# Patient Record
Sex: FEMALE | Race: BLACK OR AFRICAN AMERICAN | NOT HISPANIC OR LATINO | Employment: OTHER | URBAN - METROPOLITAN AREA
[De-identification: names, ages, dates, MRNs, and addresses within clinical notes are randomized per-mention and may not be internally consistent; named-entity substitution may affect disease eponyms.]

---

## 2023-02-05 PROBLEM — G89.29 CHRONIC UPPER ABDOMINAL PAIN: Status: ACTIVE | Noted: 2023-02-05

## 2023-02-05 PROBLEM — E78.5 DYSLIPIDEMIA: Status: ACTIVE | Noted: 2023-02-05

## 2023-02-05 PROBLEM — M25.50 JOINT PAIN: Status: ACTIVE | Noted: 2023-02-05

## 2023-02-05 PROBLEM — M54.12 CERVICAL RADICULOPATHY: Status: ACTIVE | Noted: 2023-02-05

## 2023-02-05 PROBLEM — K25.9 GASTRIC ULCER: Status: ACTIVE | Noted: 2023-02-05

## 2023-02-05 PROBLEM — A04.8 H. PYLORI INFECTION: Status: ACTIVE | Noted: 2023-02-05

## 2023-02-05 PROBLEM — N28.1 CYST OF RIGHT KIDNEY: Status: ACTIVE | Noted: 2023-02-05

## 2023-02-05 PROBLEM — U07.1 COVID-19 VIRUS INFECTION: Status: RESOLVED | Noted: 2023-02-05 | Resolved: 2023-02-05

## 2023-02-05 PROBLEM — Z85.42 HISTORY OF UTERINE CANCER: Status: ACTIVE | Noted: 2023-02-05

## 2023-02-05 PROBLEM — J20.9 ACUTE BRONCHITIS: Status: RESOLVED | Noted: 2023-02-05 | Resolved: 2023-02-05

## 2023-02-05 PROBLEM — H10.30 ACUTE CONJUNCTIVITIS: Status: RESOLVED | Noted: 2023-02-05 | Resolved: 2023-02-05

## 2023-02-05 PROBLEM — M54.50 LOW BACK PAIN: Status: ACTIVE | Noted: 2023-02-05

## 2023-02-05 PROBLEM — R10.13 EPIGASTRIC PAIN: Status: ACTIVE | Noted: 2023-02-05

## 2023-02-05 PROBLEM — E87.6 HYPOKALEMIA: Status: ACTIVE | Noted: 2023-02-05

## 2023-02-05 PROBLEM — R79.9 ABNORMAL BLOOD CHEMISTRY: Status: RESOLVED | Noted: 2023-02-05 | Resolved: 2023-02-05

## 2023-02-05 PROBLEM — G62.9 PERIPHERAL NERVE DISEASE: Status: ACTIVE | Noted: 2023-02-05

## 2023-02-05 PROBLEM — R10.11 COLICKY RUQ ABDOMINAL PAIN: Status: ACTIVE | Noted: 2023-02-05

## 2023-02-05 PROBLEM — K04.7 DENTAL ABSCESS: Status: ACTIVE | Noted: 2023-02-05

## 2023-02-05 PROBLEM — R05.9 COUGH: Status: RESOLVED | Noted: 2023-02-05 | Resolved: 2023-02-05

## 2023-02-05 PROBLEM — R10.9 ABDOMINAL PAIN: Status: RESOLVED | Noted: 2023-02-05 | Resolved: 2023-02-05

## 2023-02-05 PROBLEM — I10 BP (HIGH BLOOD PRESSURE): Status: ACTIVE | Noted: 2023-02-05

## 2023-02-05 PROBLEM — R09.89 CAROTID BRUIT: Status: ACTIVE | Noted: 2023-02-05

## 2023-02-05 PROBLEM — R10.10 CHRONIC UPPER ABDOMINAL PAIN: Status: ACTIVE | Noted: 2023-02-05

## 2023-02-05 PROBLEM — E55.9 VITAMIN D DEFICIENCY: Status: ACTIVE | Noted: 2023-02-05

## 2023-02-05 PROBLEM — K43.2 HERNIA, INCISIONAL: Status: ACTIVE | Noted: 2023-02-05

## 2023-02-05 PROBLEM — K29.70 HELICOBACTER POSITIVE GASTRITIS: Status: ACTIVE | Noted: 2023-02-05

## 2023-02-05 PROBLEM — N18.30 CKD (CHRONIC KIDNEY DISEASE), STAGE III (MULTI): Status: ACTIVE | Noted: 2023-02-05

## 2023-02-05 PROBLEM — E66.812 CLASS 2 SEVERE OBESITY WITH SERIOUS COMORBIDITY IN ADULT: Status: ACTIVE | Noted: 2023-02-05

## 2023-02-05 PROBLEM — H61.23 BILATERAL IMPACTED CERUMEN: Status: RESOLVED | Noted: 2023-02-05 | Resolved: 2023-02-05

## 2023-02-05 PROBLEM — S93.422S SPRAIN, ANKLE JOINT, MEDIAL, LEFT, SEQUELA: Status: ACTIVE | Noted: 2023-02-05

## 2023-02-05 PROBLEM — R14.0 ABDOMINAL BLOATING: Status: RESOLVED | Noted: 2023-02-05 | Resolved: 2023-02-05

## 2023-02-05 PROBLEM — M47.816 LUMBAR SPONDYLOSIS: Status: ACTIVE | Noted: 2023-02-05

## 2023-02-05 PROBLEM — R60.9 EDEMA: Status: ACTIVE | Noted: 2023-02-05

## 2023-02-05 PROBLEM — R00.2 PALPITATIONS: Status: ACTIVE | Noted: 2023-02-05

## 2023-02-05 PROBLEM — M75.81 ROTATOR CUFF TENDINITIS, RIGHT: Status: ACTIVE | Noted: 2023-02-05

## 2023-02-05 PROBLEM — M25.511 SHOULDER PAIN, RIGHT: Status: RESOLVED | Noted: 2023-02-05 | Resolved: 2023-02-05

## 2023-02-05 PROBLEM — R35.0 INCREASED FREQUENCY OF URINATION: Status: ACTIVE | Noted: 2023-02-05

## 2023-02-05 PROBLEM — E66.01 CLASS 2 SEVERE OBESITY WITH SERIOUS COMORBIDITY IN ADULT (MULTI): Status: ACTIVE | Noted: 2023-02-05

## 2023-02-05 PROBLEM — B96.81 HELICOBACTER POSITIVE GASTRITIS: Status: ACTIVE | Noted: 2023-02-05

## 2023-02-05 PROBLEM — K42.9 RECURRENT UMBILICAL HERNIA: Status: ACTIVE | Noted: 2023-02-05

## 2023-02-05 PROBLEM — M85.80 OSTEOPENIA: Status: ACTIVE | Noted: 2023-02-05

## 2023-02-05 PROBLEM — M54.2 NECK PAIN ON RIGHT SIDE: Status: ACTIVE | Noted: 2023-02-05

## 2023-02-05 RX ORDER — TRAMADOL HYDROCHLORIDE 50 MG/1
TABLET ORAL
COMMUNITY
End: 2023-09-05 | Stop reason: ALTCHOICE

## 2023-02-05 RX ORDER — ERGOCALCIFEROL 1.25 MG/1
CAPSULE ORAL
COMMUNITY
End: 2023-09-05 | Stop reason: SDUPTHER

## 2023-02-05 RX ORDER — LANSOPRAZOLE 30 MG/1
30 CAPSULE, DELAYED RELEASE ORAL
COMMUNITY
End: 2023-03-27 | Stop reason: SDUPTHER

## 2023-02-05 RX ORDER — DICYCLOMINE HYDROCHLORIDE 20 MG/1
TABLET ORAL
COMMUNITY
End: 2023-10-19 | Stop reason: ALTCHOICE

## 2023-02-05 RX ORDER — LISINOPRIL AND HYDROCHLOROTHIAZIDE 20; 25 MG/1; MG/1
TABLET ORAL
COMMUNITY
End: 2023-03-27 | Stop reason: SDUPTHER

## 2023-02-05 RX ORDER — POTASSIUM CHLORIDE 20 MEQ/1
TABLET, EXTENDED RELEASE ORAL
COMMUNITY
End: 2023-03-27 | Stop reason: SDUPTHER

## 2023-02-05 RX ORDER — ATORVASTATIN CALCIUM 20 MG/1
TABLET, FILM COATED ORAL
COMMUNITY
End: 2023-06-15 | Stop reason: SDUPTHER

## 2023-02-05 RX ORDER — NALOXONE HYDROCHLORIDE 4 MG/.1ML
SPRAY NASAL
COMMUNITY
End: 2023-10-19 | Stop reason: ALTCHOICE

## 2023-02-15 PROBLEM — R20.0 NUMBNESS: Status: ACTIVE | Noted: 2023-02-15

## 2023-03-27 DIAGNOSIS — I10 HYPERTENSION, UNSPECIFIED TYPE: ICD-10-CM

## 2023-03-27 DIAGNOSIS — K25.9 GASTRIC ULCER, UNSPECIFIED CHRONICITY, UNSPECIFIED WHETHER GASTRIC ULCER HEMORRHAGE OR PERFORATION PRESENT: ICD-10-CM

## 2023-03-27 DIAGNOSIS — E87.6 POTASSIUM DEFICIENCY: ICD-10-CM

## 2023-03-27 RX ORDER — POTASSIUM CHLORIDE 20 MEQ/1
40 TABLET, EXTENDED RELEASE ORAL DAILY
Qty: 60 TABLET | Refills: 0 | Status: SHIPPED | OUTPATIENT
Start: 2023-03-27 | End: 2023-06-15 | Stop reason: SDUPTHER

## 2023-03-27 RX ORDER — LISINOPRIL AND HYDROCHLOROTHIAZIDE 20; 25 MG/1; MG/1
1 TABLET ORAL DAILY
Qty: 90 TABLET | Refills: 0 | Status: SHIPPED | OUTPATIENT
Start: 2023-03-27 | End: 2023-07-25 | Stop reason: SDUPTHER

## 2023-03-27 RX ORDER — LANSOPRAZOLE 30 MG/1
30 CAPSULE, DELAYED RELEASE ORAL
Qty: 90 CAPSULE | Refills: 0 | Status: SHIPPED | OUTPATIENT
Start: 2023-03-27 | End: 2024-01-02 | Stop reason: SDUPTHER

## 2023-04-11 ENCOUNTER — OFFICE VISIT (OUTPATIENT)
Dept: PRIMARY CARE | Facility: CLINIC | Age: 73
End: 2023-04-11
Payer: MEDICARE

## 2023-04-11 VITALS
HEART RATE: 80 BPM | WEIGHT: 209 LBS | DIASTOLIC BLOOD PRESSURE: 87 MMHG | SYSTOLIC BLOOD PRESSURE: 122 MMHG | BODY MASS INDEX: 39.46 KG/M2 | HEIGHT: 61 IN

## 2023-04-11 DIAGNOSIS — M54.12 CERVICAL RADICULOPATHY: ICD-10-CM

## 2023-04-11 DIAGNOSIS — N18.31 STAGE 3A CHRONIC KIDNEY DISEASE (MULTI): ICD-10-CM

## 2023-04-11 DIAGNOSIS — E66.01 CLASS 2 SEVERE OBESITY WITH SERIOUS COMORBIDITY AND BODY MASS INDEX (BMI) OF 39.0 TO 39.9 IN ADULT, UNSPECIFIED OBESITY TYPE (MULTI): ICD-10-CM

## 2023-04-11 DIAGNOSIS — C54.9 MALIGNANT NEOPLASM OF BODY OF UTERUS, UNSPECIFIED SITE (MULTI): ICD-10-CM

## 2023-04-11 DIAGNOSIS — E87.6 HYPOKALEMIA: ICD-10-CM

## 2023-04-11 DIAGNOSIS — R20.0 NUMBNESS: ICD-10-CM

## 2023-04-11 DIAGNOSIS — I10 PRIMARY HYPERTENSION: Primary | ICD-10-CM

## 2023-04-11 DIAGNOSIS — M79.641 RIGHT HAND PAIN: ICD-10-CM

## 2023-04-11 DIAGNOSIS — R10.13 EPIGASTRIC PAIN: ICD-10-CM

## 2023-04-11 LAB
ALANINE AMINOTRANSFERASE (SGPT) (U/L) IN SER/PLAS: 32 U/L (ref 7–45)
ALBUMIN (G/DL) IN SER/PLAS: 3.6 G/DL (ref 3.4–5)
ALKALINE PHOSPHATASE (U/L) IN SER/PLAS: 74 U/L (ref 33–136)
ANION GAP IN SER/PLAS: 11 MMOL/L (ref 10–20)
ASPARTATE AMINOTRANSFERASE (SGOT) (U/L) IN SER/PLAS: 49 U/L (ref 9–39)
BILIRUBIN TOTAL (MG/DL) IN SER/PLAS: 0.6 MG/DL (ref 0–1.2)
CALCIUM (MG/DL) IN SER/PLAS: 8.8 MG/DL (ref 8.6–10.6)
CARBON DIOXIDE, TOTAL (MMOL/L) IN SER/PLAS: 31 MMOL/L (ref 21–32)
CHLORIDE (MMOL/L) IN SER/PLAS: 104 MMOL/L (ref 98–107)
CHOLESTEROL (MG/DL) IN SER/PLAS: 202 MG/DL (ref 0–199)
CHOLESTEROL IN HDL (MG/DL) IN SER/PLAS: 85.1 MG/DL
CHOLESTEROL/HDL RATIO: 2.4
CREATININE (MG/DL) IN SER/PLAS: 0.85 MG/DL (ref 0.5–1.05)
ERYTHROCYTE DISTRIBUTION WIDTH (RATIO) BY AUTOMATED COUNT: 13.3 % (ref 11.5–14.5)
ERYTHROCYTE MEAN CORPUSCULAR HEMOGLOBIN CONCENTRATION (G/DL) BY AUTOMATED: 32.1 G/DL (ref 32–36)
ERYTHROCYTE MEAN CORPUSCULAR VOLUME (FL) BY AUTOMATED COUNT: 97 FL (ref 80–100)
ERYTHROCYTES (10*6/UL) IN BLOOD BY AUTOMATED COUNT: 4.22 X10E12/L (ref 4–5.2)
GFR FEMALE: 73 ML/MIN/1.73M2
GLUCOSE (MG/DL) IN SER/PLAS: 87 MG/DL (ref 74–99)
HEMATOCRIT (%) IN BLOOD BY AUTOMATED COUNT: 40.8 % (ref 36–46)
HEMOGLOBIN (G/DL) IN BLOOD: 13.1 G/DL (ref 12–16)
LDL: 97 MG/DL (ref 0–99)
LEUKOCYTES (10*3/UL) IN BLOOD BY AUTOMATED COUNT: 5.8 X10E9/L (ref 4.4–11.3)
NRBC (PER 100 WBCS) BY AUTOMATED COUNT: 0 /100 WBC (ref 0–0)
PLATELETS (10*3/UL) IN BLOOD AUTOMATED COUNT: 241 X10E9/L (ref 150–450)
POTASSIUM (MMOL/L) IN SER/PLAS: 4 MMOL/L (ref 3.5–5.3)
PROTEIN TOTAL: 6.1 G/DL (ref 6.4–8.2)
SODIUM (MMOL/L) IN SER/PLAS: 142 MMOL/L (ref 136–145)
THYROTROPIN (MIU/L) IN SER/PLAS BY DETECTION LIMIT <= 0.05 MIU/L: 2.27 MIU/L (ref 0.44–3.98)
TRIGLYCERIDE (MG/DL) IN SER/PLAS: 99 MG/DL (ref 0–149)
UREA NITROGEN (MG/DL) IN SER/PLAS: 12 MG/DL (ref 6–23)
VLDL: 20 MG/DL (ref 0–40)

## 2023-04-11 PROCEDURE — 85027 COMPLETE CBC AUTOMATED: CPT

## 2023-04-11 PROCEDURE — 80061 LIPID PANEL: CPT

## 2023-04-11 PROCEDURE — 80053 COMPREHEN METABOLIC PANEL: CPT

## 2023-04-11 PROCEDURE — 1159F MED LIST DOCD IN RCRD: CPT | Performed by: FAMILY MEDICINE

## 2023-04-11 PROCEDURE — 1036F TOBACCO NON-USER: CPT | Performed by: FAMILY MEDICINE

## 2023-04-11 PROCEDURE — 3079F DIAST BP 80-89 MM HG: CPT | Performed by: FAMILY MEDICINE

## 2023-04-11 PROCEDURE — 36415 COLL VENOUS BLD VENIPUNCTURE: CPT | Performed by: FAMILY MEDICINE

## 2023-04-11 PROCEDURE — 84443 ASSAY THYROID STIM HORMONE: CPT

## 2023-04-11 PROCEDURE — 3074F SYST BP LT 130 MM HG: CPT | Performed by: FAMILY MEDICINE

## 2023-04-11 PROCEDURE — 99214 OFFICE O/P EST MOD 30 MIN: CPT | Performed by: FAMILY MEDICINE

## 2023-04-11 PROCEDURE — 3008F BODY MASS INDEX DOCD: CPT | Performed by: FAMILY MEDICINE

## 2023-04-11 ASSESSMENT — ENCOUNTER SYMPTOMS
NECK PAIN: 1
ALLERGIC/IMMUNOLOGIC NEGATIVE: 1
CONSTITUTIONAL NEGATIVE: 1
HEMATOLOGIC/LYMPHATIC NEGATIVE: 1
ENDOCRINE NEGATIVE: 1
NEUROLOGICAL NEGATIVE: 1
EYES NEGATIVE: 1
CARDIOVASCULAR NEGATIVE: 1
GASTROINTESTINAL NEGATIVE: 1
RESPIRATORY NEGATIVE: 1

## 2023-04-11 NOTE — PROGRESS NOTES
"Subjective   Patient ID: Ana Lira is a 73 y.o. female who presents for Follow-up (3 month chest tightness ongoing) and Neck Pain.  Today she is accompanied by alone.     Neck.  Specially to the right side states she reads a lot and is looking down most of the day denies any recent injuries  Blood pressure controlled  Trying to lose weight  Abdominal pain improved taking Gaviscon  Has a small cyst on the right thumb which has been painful    Neck Pain       Current Outpatient Medications on File Prior to Visit   Medication Sig Dispense Refill    atorvastatin (Lipitor) 20 mg tablet Take 1 tablet daily  APOT TAB      dicyclomine (Bentyl) 20 mg tablet TAKE 1 TABLET EVERY 6 HOURS AS NEEDED.      ergocalciferol (Vitamin D-2) 1.25 MG (77501 UT) capsule TAKE 1 CAPSULE Weekly      lansoprazole (Prevacid) 30 mg DR capsule Take 1 capsule (30 mg) by mouth once daily in the morning. Take before meals. Do not crush or chew. 90 capsule 0    lisinopriL-hydrochlorothiazide 20-25 mg tablet Take 1 tablet by mouth once daily. 90 tablet 0    potassium chloride CR (Klor-Con M20) 20 mEq ER tablet Take 2 tablets (40 mEq) by mouth once daily. Do not crush or chew. 60 tablet 0    naloxone (Narcan) 4 mg/0.1 mL nasal spray Apply 1 spray in each nostril AS NEEDED for OPIATE OVERDOSE or shallow breathing/ severe sedation.      traMADol (Ultram) 50 mg tablet TAKE 1 TABLET 3 TIMES DAILY AS NEEDED.       No current facility-administered medications on file prior to visit.        Review of Systems   Constitutional: Negative.    HENT: Negative.     Eyes: Negative.    Respiratory: Negative.     Cardiovascular: Negative.    Gastrointestinal: Negative.    Endocrine: Negative.    Genitourinary: Negative.    Musculoskeletal:  Positive for neck pain.   Allergic/Immunologic: Negative.    Neurological: Negative.    Hematological: Negative.        Objective   Blood Pressure 122/87   Pulse 80   Height 1.549 m (5' 1\")   Weight 94.8 kg (209 lb)   Body " Mass Index 39.49 kg/m²   BSA: 2.02 meters squared  Growth percentiles: Facility age limit for growth %tl is 20 years. Facility age limit for growth %tl is 20 years.   Office Visit on 04/11/2023   Component Date Value Ref Range Status    Glucose 04/11/2023 87  74 - 99 mg/dL Final    Sodium 04/11/2023 142  136 - 145 mmol/L Final    Potassium 04/11/2023 4.0  3.5 - 5.3 mmol/L Final    Chloride 04/11/2023 104  98 - 107 mmol/L Final    Bicarbonate 04/11/2023 31  21 - 32 mmol/L Final    Anion Gap 04/11/2023 11  10 - 20 mmol/L Final    Urea Nitrogen 04/11/2023 12  6 - 23 mg/dL Final    Creatinine 04/11/2023 0.85  0.50 - 1.05 mg/dL Final    GFR Female 04/11/2023 73  >90 mL/min/1.73m2 Final     CALCULATIONS OF ESTIMATED GFR ARE PERFORMED   USING THE 2021 CKD-EPI STUDY REFIT EQUATION   WITHOUT THE RACE VARIABLE FOR THE IDMS-TRACEABLE   CREATININE METHODS.    https://jasn.asnjournals.org/content/early/2021/09/22/ASN.7703343014    Calcium 04/11/2023 8.8  8.6 - 10.6 mg/dL Final    Albumin 04/11/2023 3.6  3.4 - 5.0 g/dL Final    Alkaline Phosphatase 04/11/2023 74  33 - 136 U/L Final    Total Protein 04/11/2023 6.1 (L)  6.4 - 8.2 g/dL Final    AST 04/11/2023 49 (H)  9 - 39 U/L Final    Total Bilirubin 04/11/2023 0.6  0.0 - 1.2 mg/dL Final    ALT (SGPT) 04/11/2023 32  7 - 45 U/L Final     Patients treated with Sulfasalazine may generate    falsely decreased results for ALT.    Cholesterol 04/11/2023 202 (H)  0 - 199 mg/dL Final    .      AGE      DESIRABLE   BORDERLINE HIGH   HIGH     0-19 Y     0 - 169       170 - 199     >/= 200    20-24 Y     0 - 189       190 - 224     >/= 225         >24 Y     0 - 199       200 - 239     >/= 240   **All ranges are based on fasting samples. Specific   therapeutic targets will vary based on patient-specific   cardiac risk.  .   Pediatric guidelines reference:Pediatrics 2011, 128(S5).   Adult guidelines reference: NCEP ATPIII Guidelines,     MELCHOR 2001, 258:2486-97  .   Venipuncture immediately  after or during the    administration of Metamizole may lead to falsely   low results. Testing should be performed immediately   prior to Metamizole dosing.    HDL 04/11/2023 85.1  mg/dL Final    .      AGE      VERY LOW   LOW     NORMAL    HIGH       0-19 Y       < 35   < 40     40-45     ----    20-24 Y       ----   < 40       >45     ----      >24 Y       ----   < 40     40-60      >60  .    Cholesterol/HDL Ratio 04/11/2023 2.4   Final    REF VALUES  DESIRABLE  < 3.4  HIGH RISK  > 5.0    LDL 04/11/2023 97  0 - 99 mg/dL Final    .                           NEAR      BORD      AGE      DESIRABLE  OPTIMAL    HIGH     HIGH     VERY HIGH     0-19 Y     0 - 109     ---    110-129   >/= 130     ----    20-24 Y     0 - 119     ---    120-159   >/= 160     ----      >24 Y     0 -  99   100-129  130-159   160-189     >/=190  .    VLDL 04/11/2023 20  0 - 40 mg/dL Final    Triglycerides 04/11/2023 99  0 - 149 mg/dL Final    .      AGE      DESIRABLE   BORDERLINE HIGH   HIGH     VERY HIGH   0 D-90 D    19 - 174         ----         ----        ----  91 D- 9 Y     0 -  74        75 -  99     >/= 100      ----    10-19 Y     0 -  89        90 - 129     >/= 130      ----    20-24 Y     0 - 114       115 - 149     >/= 150      ----         >24 Y     0 - 149       150 - 199    200- 499    >/= 500  .   Venipuncture immediately after or during the    administration of Metamizole may lead to falsely   low results. Testing should be performed immediately   prior to Metamizole dosing.    TSH 04/11/2023 2.27  0.44 - 3.98 mIU/L Final     TSH testing is performed using different testing    methodology at [facility] than at other    [facility]. Direct result comparisons should    only be made within the same method.    WBC 04/11/2023 5.8  4.4 - 11.3 x10E9/L Final    nRBC 04/11/2023 0.0  0.0 - 0.0 /100 WBC Final    RBC 04/11/2023 4.22  4.00 - 5.20 x10E12/L Final    Hemoglobin 04/11/2023 13.1  12.0 - 16.0 g/dL Final     Hematocrit 04/11/2023 40.8  36.0 - 46.0 % Final    MCV 04/11/2023 97  80 - 100 fL Final    MCHC 04/11/2023 32.1  32.0 - 36.0 g/dL Final    Platelets 04/11/2023 241  150 - 450 x10E9/L Final    RDW 04/11/2023 13.3  11.5 - 14.5 % Final      Physical Exam  Vitals and nursing note reviewed.   Constitutional:       Appearance: Normal appearance.   HENT:      Head: Normocephalic and atraumatic.      Right Ear: Tympanic membrane normal.      Left Ear: Tympanic membrane normal.      Nose: Nose normal.      Mouth/Throat:      Mouth: Mucous membranes are moist.   Eyes:      Pupils: Pupils are equal, round, and reactive to light.   Cardiovascular:      Rate and Rhythm: Normal rate and regular rhythm.      Pulses: Normal pulses.      Heart sounds: Normal heart sounds.   Pulmonary:      Effort: Pulmonary effort is normal.      Breath sounds: Normal breath sounds.   Abdominal:      General: Abdomen is flat. Bowel sounds are normal.      Palpations: Abdomen is soft.   Musculoskeletal:         General: Normal range of motion.      Cervical back: Normal range of motion and neck supple.   Skin:     General: Skin is warm and dry.      Capillary Refill: Capillary refill takes less than 2 seconds.   Neurological:      General: No focal deficit present.      Mental Status: She is alert and oriented to person, place, and time.   Psychiatric:         Mood and Affect: Mood normal.         Assessment/Plan   Problem List Items Addressed This Visit          Nervous    Cervical radiculopathy     Continue to use heat massage lidocaine avoid looking down or looking up for any length of time call back if symptoms do not improve         Epigastric pain     Continue follow-up with GI         Numbness     Symptoms improved         Relevant Orders    Lipid Panel (Completed)    TSH with reflex to Free T4 if abnormal (Completed)    CBC (Completed)       Circulatory    BP (high blood pressure) - Primary     Continue medication we will monitor blood  pressure at home         Relevant Orders    Comprehensive Metabolic Panel (Completed)    Lipid Panel (Completed)    TSH with reflex to Free T4 if abnormal (Completed)    CBC (Completed)       Genitourinary    CKD (chronic kidney disease), stage III (CMS/HCC)     Will check labs         Relevant Orders    Lipid Panel (Completed)    TSH with reflex to Free T4 if abnormal (Completed)    CBC (Completed)    RESOLVED: Malignant neoplasm of body of uterus, unspecified site (CMS/HCC)       Musculoskeletal    Right hand pain    Relevant Orders    XR hand right 3+ views       Endocrine/Metabolic    Class 2 severe obesity with serious comorbidity in adult (CMS/[facility])     patient is advised to lose weight by reducing calorie intake and increasing activity levels, especially aerobic exercise             Other    Hypokalemia    Relevant Orders    Lipid Panel (Completed)    TSH with reflex to Free T4 if abnormal (Completed)    CBC (Completed)     [unfilled]    Assessment/Plan   Problem List Items Addressed This Visit          Nervous    Cervical radiculopathy     Continue to use heat massage lidocaine avoid looking down or looking up for any length of time call back if symptoms do not improve         Epigastric pain     Continue follow-up with GI         Numbness     Symptoms improved         Relevant Orders    Lipid Panel (Completed)    TSH with reflex to Free T4 if abnormal (Completed)    CBC (Completed)       Circulatory    BP (high blood pressure) - Primary     Continue medication we will monitor blood pressure at home         Relevant Orders    Comprehensive Metabolic Panel (Completed)    Lipid Panel (Completed)    TSH with reflex to Free T4 if abnormal (Completed)    CBC (Completed)       Genitourinary    CKD (chronic kidney disease), stage III (CMS/HCC)     Will check labs         Relevant Orders    Lipid Panel (Completed)    TSH with reflex to Free T4 if abnormal (Completed)    CBC (Completed)    RESOLVED: Malignant neoplasm  of body of uterus, unspecified site (CMS/HCC)       Musculoskeletal    Right hand pain    Relevant Orders    XR hand right 3+ views       Endocrine/Metabolic    Class 2 severe obesity with serious comorbidity in adult (CMS/HCC)     patient is advised to lose weight by reducing calorie intake and increasing activity levels, especially aerobic exercise             Other    Hypokalemia    Relevant Orders    Lipid Panel (Completed)    TSH with reflex to Free T4 if abnormal (Completed)    CBC (Completed)

## 2023-04-12 ENCOUNTER — TELEPHONE (OUTPATIENT)
Dept: PRIMARY CARE | Facility: CLINIC | Age: 73
End: 2023-04-12
Payer: MEDICARE

## 2023-04-12 NOTE — TELEPHONE ENCOUNTER
----- Message from Taurus Espinosa MD sent at 4/12/2023  8:19 AM EDT -----  Please call the patient regarding her abnormal result.  Liver enzyme slightly elevated. Repeat hepatic function and GGT in 3-4 weeks as nurse visit. Avoid alcohol.

## 2023-04-13 PROBLEM — M79.641 RIGHT HAND PAIN: Status: ACTIVE | Noted: 2023-04-13

## 2023-04-13 PROBLEM — C54.9: Status: RESOLVED | Noted: 2023-04-13 | Resolved: 2023-04-13

## 2023-04-13 PROBLEM — C54.9: Status: ACTIVE | Noted: 2023-04-13

## 2023-04-13 NOTE — ASSESSMENT & PLAN NOTE
Continue follow-up with GI  
Continue medication we will monitor blood pressure at home  
Continue to use heat massage lidocaine avoid looking down or looking up for any length of time call back if symptoms do not improve  
Symptoms improved  
Will check labs  
patient is advised to lose weight by reducing calorie intake and increasing activity levels, especially aerobic exercise   
complains of pain/discomfort

## 2023-04-14 ENCOUNTER — TELEPHONE (OUTPATIENT)
Dept: PRIMARY CARE | Facility: CLINIC | Age: 73
End: 2023-04-14
Payer: MEDICARE

## 2023-04-21 ENCOUNTER — TELEPHONE (OUTPATIENT)
Dept: PRIMARY CARE | Facility: CLINIC | Age: 73
End: 2023-04-21
Payer: MEDICARE

## 2023-05-16 ENCOUNTER — CLINICAL SUPPORT (OUTPATIENT)
Dept: PRIMARY CARE | Facility: CLINIC | Age: 73
End: 2023-05-16
Payer: MEDICARE

## 2023-05-16 DIAGNOSIS — R74.8 ELEVATED LIVER ENZYMES: ICD-10-CM

## 2023-05-16 LAB
ALANINE AMINOTRANSFERASE (SGPT) (U/L) IN SER/PLAS: 27 U/L (ref 7–45)
ALBUMIN (G/DL) IN SER/PLAS: 3.9 G/DL (ref 3.4–5)
ALKALINE PHOSPHATASE (U/L) IN SER/PLAS: 66 U/L (ref 33–136)
ASPARTATE AMINOTRANSFERASE (SGOT) (U/L) IN SER/PLAS: 35 U/L (ref 9–39)
BILIRUBIN DIRECT (MG/DL) IN SER/PLAS: 0.1 MG/DL (ref 0–0.3)
BILIRUBIN TOTAL (MG/DL) IN SER/PLAS: 0.6 MG/DL (ref 0–1.2)
GAMMA GLUTAMYL TRANSFERASE (U/L) IN SER/PLAS: 25 U/L (ref 5–55)
PROTEIN TOTAL: 6.5 G/DL (ref 6.4–8.2)

## 2023-05-16 PROCEDURE — 36415 COLL VENOUS BLD VENIPUNCTURE: CPT

## 2023-05-16 PROCEDURE — 82977 ASSAY OF GGT: CPT

## 2023-05-16 PROCEDURE — 80076 HEPATIC FUNCTION PANEL: CPT

## 2023-06-15 ENCOUNTER — OFFICE VISIT (OUTPATIENT)
Dept: PRIMARY CARE | Facility: CLINIC | Age: 73
End: 2023-06-15
Payer: MEDICARE

## 2023-06-15 VITALS
HEIGHT: 61 IN | BODY MASS INDEX: 38.51 KG/M2 | HEART RATE: 101 BPM | DIASTOLIC BLOOD PRESSURE: 82 MMHG | SYSTOLIC BLOOD PRESSURE: 111 MMHG | WEIGHT: 204 LBS

## 2023-06-15 DIAGNOSIS — E87.6 POTASSIUM DEFICIENCY: ICD-10-CM

## 2023-06-15 DIAGNOSIS — I10 PRIMARY HYPERTENSION: ICD-10-CM

## 2023-06-15 DIAGNOSIS — R10.10 CHRONIC UPPER ABDOMINAL PAIN: ICD-10-CM

## 2023-06-15 DIAGNOSIS — E78.5 DYSLIPIDEMIA: ICD-10-CM

## 2023-06-15 DIAGNOSIS — N18.31 STAGE 3A CHRONIC KIDNEY DISEASE (MULTI): ICD-10-CM

## 2023-06-15 DIAGNOSIS — R00.2 PALPITATIONS: ICD-10-CM

## 2023-06-15 DIAGNOSIS — E66.01 CLASS 2 SEVERE OBESITY WITH SERIOUS COMORBIDITY AND BODY MASS INDEX (BMI) OF 38.0 TO 38.9 IN ADULT, UNSPECIFIED OBESITY TYPE (MULTI): ICD-10-CM

## 2023-06-15 DIAGNOSIS — M47.816 LUMBAR SPONDYLOSIS: ICD-10-CM

## 2023-06-15 DIAGNOSIS — Z12.31 VISIT FOR SCREENING MAMMOGRAM: ICD-10-CM

## 2023-06-15 DIAGNOSIS — R00.2 PALPITATION: Primary | ICD-10-CM

## 2023-06-15 DIAGNOSIS — G89.29 CHRONIC UPPER ABDOMINAL PAIN: ICD-10-CM

## 2023-06-15 PROCEDURE — 3079F DIAST BP 80-89 MM HG: CPT | Performed by: FAMILY MEDICINE

## 2023-06-15 PROCEDURE — 1159F MED LIST DOCD IN RCRD: CPT | Performed by: FAMILY MEDICINE

## 2023-06-15 PROCEDURE — 3074F SYST BP LT 130 MM HG: CPT | Performed by: FAMILY MEDICINE

## 2023-06-15 PROCEDURE — 1036F TOBACCO NON-USER: CPT | Performed by: FAMILY MEDICINE

## 2023-06-15 PROCEDURE — 99214 OFFICE O/P EST MOD 30 MIN: CPT | Performed by: FAMILY MEDICINE

## 2023-06-15 PROCEDURE — 3008F BODY MASS INDEX DOCD: CPT | Performed by: FAMILY MEDICINE

## 2023-06-15 RX ORDER — AMITRIPTYLINE HYDROCHLORIDE 10 MG/1
10 TABLET, FILM COATED ORAL NIGHTLY
COMMUNITY
Start: 2023-04-20 | End: 2023-06-17 | Stop reason: ALTCHOICE

## 2023-06-15 RX ORDER — POTASSIUM CHLORIDE 20 MEQ/1
40 TABLET, EXTENDED RELEASE ORAL DAILY
Qty: 60 TABLET | Refills: 0 | Status: SHIPPED | OUTPATIENT
Start: 2023-06-15 | End: 2023-08-21 | Stop reason: SDUPTHER

## 2023-06-15 RX ORDER — ATORVASTATIN CALCIUM 20 MG/1
TABLET, FILM COATED ORAL
Qty: 90 TABLET | Refills: 3 | Status: SHIPPED | OUTPATIENT
Start: 2023-06-15

## 2023-06-17 NOTE — ASSESSMENT & PLAN NOTE
patient is advised to lose weight by reducing calorie intake and increasing activity levels, especially aerobic exercise

## 2023-06-17 NOTE — PROGRESS NOTES
Assessment and Plan:  Problem List Items Addressed This Visit       Chronic upper abdominal pain    Current Assessment & Plan     Continue PPI         BP (high blood pressure)    Current Assessment & Plan     Continue medication we will monitor blood pressure at home         CKD (chronic kidney disease), stage III (CMS/[facility])    Current Assessment & Plan     Will check labs         Class 2 severe obesity with serious comorbidity in adult (CMS/[facility])    Current Assessment & Plan     patient is advised to lose weight by reducing calorie intake and increasing activity levels, especially aerobic exercise          Dyslipidemia    Relevant Medications    atorvastatin (Lipitor) 20 mg tablet    Lumbar spondylosis    Relevant Orders    Disability Placard    Palpitations    Current Assessment & Plan     Patient states that she has been having palpitations associated with sweating fell to the ground in her bathroom in the middle of the night went to the ER work-up was negative will get Holter monitor to evaluate for arrhythmia          Other Visit Diagnoses       Palpitation    -  Primary    Relevant Orders    Holter or Event Cardiac Monitor    Potassium deficiency        Relevant Medications    potassium chloride CR (Klor-Con M20) 20 mEq ER tablet    Visit for screening mammogram        Relevant Orders    BI mammo bilateral screening tomosynthesis              HPI:  Patient was in the ER states she got up on Saturday and night went to the bathroom passed out felt weak had palpitations has had episodes of palpitations and was advised that she may have anxiety but has frequent episodes of palpitations dizziness warm feeling all over      ROS   Constitutional:  o weight loss. Negative for chills and fever.   HENT: Negative.     Respiratory: Negative.     Cardiovascular: Negative.    Gastrointestinal: Negative.  Negative for nausea and vomiting.   Endocrine: Negative.    Genitourinary: Negative.    Musculoskeletal: Negative.    Skin:  "Negative.  Negative for rash.   Allergic/Immunologic: Negative.    Neurological: Negative.    Hematological: Negative.    Psychiatric/Behavioral: Negative.     All other systems reviewed and are negative.      Blood Pressure 111/82   Pulse 101   Height 1.549 m (5' 1\")   Weight 92.5 kg (204 lb)   Body Mass Index 38.55 kg/m²   Body mass index is 38.55 kg/m².  Physical Exam    ENT:      Head: Normocephalic and atraumatic.      Right Ear: Tympanic membrane normal.      Left Ear: Tympanic membrane normal.      Nose: Nose normal.      Mouth/Throat:      Mouth: Mucous membranes are moist.   Eyes:      Pupils: Pupils are equal, round, and reactive to light.   Cardiovascular:      Rate and Rhythm: Normal rate and regular rhythm.      Pulses: Normal pulses.      Heart sounds: Normal heart sounds.   Pulmonary:      Effort: Pulmonary effort is normal.      Breath sounds: Normal breath sounds.   Abdominal:      General: Abdomen is flat. Bowel sounds are normal.      Palpations: Abdomen is soft.   Musculoskeletal:         General: Normal range of motion.      Cervical back: Normal range of motion and neck supple.   Skin:     General: Skin is warm and dry.      Capillary Refill: Capillary refill takes less than 2 seconds.   Neurological:      General: No focal deficit present.      Mental Status: She is alert and oriented to person, place, and time.   Psychiatric:         Mood and Affect: Mood normal.       Active Problem List  Patient Active Problem List   Diagnosis    Chronic upper abdominal pain    BP (high blood pressure)    Carotid bruit    Cervical radiculopathy    CKD (chronic kidney disease), stage III (CMS/HCC)    Class 2 severe obesity with serious comorbidity in adult (CMS/HCC)    Colicky RUQ abdominal pain    Epigastric pain    Cyst of right kidney    Dental abscess    Dyslipidemia    Edema    Gastric ulcer    H. pylori infection    Helicobacter positive gastritis    Hernia, incisional    History of uterine cancer "    Hypokalemia    Increased frequency of urination    Joint pain    Low back pain    Lumbar spondylosis    Neck pain on right side    Osteopenia    Palpitations    Peripheral nerve disease    Recurrent umbilical hernia    Rotator cuff tendinitis, right    Sprain, ankle joint, medial, left, sequela    Vitamin D deficiency    Numbness    Right hand pain       Comprehensive Medical/Surgical/Social/Family History  Past Medical History:   Diagnosis Date    Abdominal bloating 02/05/2023    Abdominal pain 02/05/2023    Bilateral impacted cerumen 02/05/2023    Cough 02/05/2023    COVID-19 virus infection 02/05/2023    Malignant neoplasm of body of uterus, unspecified site (CMS/[facility]) 04/13/2023    Personal history of malignant neoplasm of other parts of uterus 07/25/2020    History of malignant neoplasm of other parts of uterus    Shoulder pain, right 02/05/2023    Uterine cancer (CMS/[facility]) 08/16/2012     Past Surgical History:   Procedure Laterality Date    TOTAL ABDOMINAL HYSTERECTOMY W/ BILATERAL SALPINGOOPHORECTOMY  04/25/2016    Total Abdominal Hysterectomy With Removal Of Both Ovaries     Social History     Social History Narrative    Not on file       Allergies and Medications  Codeine and Oxycodone-acetaminophen  Current Outpatient Medications   Medication Instructions    atorvastatin (Lipitor) 20 mg tablet Take 1 tablet daily  APOT TAB    dicyclomine (Bentyl) 20 mg tablet TAKE 1 TABLET EVERY 6 HOURS AS NEEDED.    ergocalciferol (Vitamin D-2) 1.25 MG (11685 UT) capsule TAKE 1 CAPSULE Weekly    lansoprazole (PREVACID) 30 mg, oral, Daily before breakfast, Do not crush or chew.     lisinopriL-hydrochlorothiazide 20-25 mg tablet 1 tablet, oral, Daily    naloxone (Narcan) 4 mg/0.1 mL nasal spray Apply 1 spray in each nostril AS NEEDED for OPIATE OVERDOSE or shallow breathing/ severe sedation.    potassium chloride CR (Klor-Con M20) 20 mEq ER tablet 40 mEq, oral, Daily, Do not crush or chew.     traMADol (Ultram) 50 mg  tablet TAKE 1 TABLET 3 TIMES DAILY AS NEEDED.   Assessment and Plan:  Problem List Items Addressed This Visit       Chronic upper abdominal pain    Current Assessment & Plan     Continue PPI         BP (high blood pressure)    Current Assessment & Plan     Continue medication we will monitor blood pressure at home         CKD (chronic kidney disease), stage III (CMS/[facility])    Current Assessment & Plan     Will check labs         Class 2 severe obesity with serious comorbidity in adult (CMS/[facility])    Current Assessment & Plan     patient is advised to lose weight by reducing calorie intake and increasing activity levels, especially aerobic exercise          Dyslipidemia    Relevant Medications    atorvastatin (Lipitor) 20 mg tablet    Lumbar spondylosis    Relevant Orders    Disability Placard    Palpitations    Current Assessment & Plan     Patient states that she has been having palpitations associated with sweating fell to the ground in her bathroom in the middle of the night went to the ER work-up was negative will get Holter monitor to evaluate for arrhythmia          Other Visit Diagnoses       Palpitation    -  Primary    Relevant Orders    Holter or Event Cardiac Monitor    Potassium deficiency        Relevant Medications    potassium chloride CR (Klor-Con M20) 20 mEq ER tablet    Visit for screening mammogram        Relevant Orders    BI mammo bilateral screening tomosynthesis

## 2023-06-17 NOTE — ASSESSMENT & PLAN NOTE
Patient states that she has been having palpitations associated with sweating fell to the ground in her bathroom in the middle of the night went to the ER work-up was negative will get Holter monitor to evaluate for arrhythmia

## 2023-07-03 ENCOUNTER — TELEMEDICINE (OUTPATIENT)
Dept: PRIMARY CARE | Facility: CLINIC | Age: 73
End: 2023-07-03
Payer: MEDICARE

## 2023-07-03 VITALS — WEIGHT: 204 LBS | HEIGHT: 61 IN | BODY MASS INDEX: 38.51 KG/M2
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
## 2023-07-03 DIAGNOSIS — I10 ESSENTIAL HYPERTENSION: ICD-10-CM

## 2023-07-03 DIAGNOSIS — M25.50 ARTHRALGIA, UNSPECIFIED JOINT: ICD-10-CM

## 2023-07-03 DIAGNOSIS — M54.50 ACUTE RIGHT-SIDED LOW BACK PAIN WITHOUT SCIATICA: ICD-10-CM

## 2023-07-03 PROCEDURE — 99214 OFFICE O/P EST MOD 30 MIN: CPT | Performed by: FAMILY MEDICINE

## 2023-07-03 NOTE — PROGRESS NOTES
Subjective   Patient ID: Ana Lira is a 73 y.o. female who presents for Shoulder Pain (Pain   from fall in June 2023), Neck Pain, Hip Pain, and Headache.      Shoulder Pain   Pertinent negatives include no fever.   Neck Pain   Associated symptoms include headaches. Pertinent negatives include no fever.   Hip Pain     Headache   Associated symptoms include back pain and neck pain. Pertinent negatives include no fever, nausea or vomiting.    patient continues to have pain on the right side of body starting from her neck or back rib cage lower back and hip pain is not improving fell 3 weeks ago  Current Outpatient Medications on File Prior to Visit   Medication Sig Dispense Refill    atorvastatin (Lipitor) 20 mg tablet Take 1 tablet daily  APOT TAB 90 tablet 3    dicyclomine (Bentyl) 20 mg tablet TAKE 1 TABLET EVERY 6 HOURS AS NEEDED.      ergocalciferol (Vitamin D-2) 1.25 MG (06324 UT) capsule TAKE 1 CAPSULE Weekly      lansoprazole (Prevacid) 30 mg DR capsule Take 1 capsule (30 mg) by mouth once daily in the morning. Take before meals. Do not crush or chew. 90 capsule 0    lisinopriL-hydrochlorothiazide 20-25 mg tablet Take 1 tablet by mouth once daily. 90 tablet 0    naloxone (Narcan) 4 mg/0.1 mL nasal spray Apply 1 spray in each nostril AS NEEDED for OPIATE OVERDOSE or shallow breathing/ severe sedation.      potassium chloride CR (Klor-Con M20) 20 mEq ER tablet Take 2 tablets (40 mEq) by mouth once daily. Do not crush or chew. 60 tablet 0    traMADol (Ultram) 50 mg tablet TAKE 1 TABLET 3 TIMES DAILY AS NEEDED.       No current facility-administered medications on file prior to visit.        Review of Systems   Constitutional:  Negative for chills and fever.   HENT: Negative.     Respiratory: Negative.     Cardiovascular: Negative.    Gastrointestinal: Negative.  Negative for nausea and vomiting.   Endocrine: Negative.    Genitourinary: Negative.    Musculoskeletal:  Positive for arthralgias, back pain, neck  "pain and neck stiffness.   Skin: Negative.  Negative for rash.   Allergic/Immunologic: Negative.    Neurological:  Positive for headaches.   Hematological: Negative.    Psychiatric/Behavioral: Negative.     All other systems reviewed and are negative.      Objective   Height 1.549 m (5' 1\")   Weight 92.5 kg (204 lb)   Body Mass Index 38.55 kg/m²   BSA: 2 meters squared  Growth percentiles: Facility age limit for growth %tl is 20 years. Facility age limit for growth %tl is 20 years.   No visits with results within 1 Week(s) from this visit.   Latest known visit with results is:   Clinical Support on 05/16/2023   Component Date Value Ref Range Status    Albumin 05/16/2023 3.9  3.4 - 5.0 g/dL Final    Total Bilirubin 05/16/2023 0.6  0.0 - 1.2 mg/dL Final    Bilirubin, Direct 05/16/2023 0.1  0.0 - 0.3 mg/dL Final    Alkaline Phosphatase 05/16/2023 66  33 - 136 U/L Final    ALT (SGPT) 05/16/2023 27  7 - 45 U/L Final     Patients treated with Sulfasalazine may generate    falsely decreased results for ALT.    AST 05/16/2023 35  9 - 39 U/L Final    Total Protein 05/16/2023 6.5  6.4 - 8.2 g/dL Final    GGT 05/16/2023 25  5 - 55 U/L Final      Physical Exam  Constitutional:       General: She is not in acute distress.        Assessment/Plan   Problem List Items Addressed This Visit       Joint pain    Low back pain    Essential hypertension    Fall - Primary    Relevant Orders    XR cervical spine 2-3 views (Completed)    XR thoracic spine 3 views (Completed)    XR ribs right 2 views (Completed)    XR hip w pelvis (Completed)    XR lumbar spine 2-3 views (Completed)     Joint pain patient will continue tramadol as needed avoid NSAIDs due to history of abdominal pain esophageal reflux         "
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
## 2023-07-04 ASSESSMENT — ENCOUNTER SYMPTOMS
NECK STIFFNESS: 1
HIP PAIN: 1
PSYCHIATRIC NEGATIVE: 1
FEVER: 0
HEMATOLOGIC/LYMPHATIC NEGATIVE: 1
HEADACHES: 1
NAUSEA: 0
CHILLS: 0
RESPIRATORY NEGATIVE: 1
ALLERGIC/IMMUNOLOGIC NEGATIVE: 1
GASTROINTESTINAL NEGATIVE: 1
CARDIOVASCULAR NEGATIVE: 1
BACK PAIN: 1
VOMITING: 0
ENDOCRINE NEGATIVE: 1
NECK PAIN: 1
ARTHRALGIAS: 1

## 2023-07-06 ENCOUNTER — TELEPHONE (OUTPATIENT)
Dept: PRIMARY CARE | Facility: CLINIC | Age: 73
End: 2023-07-06
Payer: MEDICARE

## 2023-07-06 DIAGNOSIS — M47.812 CERVICAL SPINE ARTHRITIS: ICD-10-CM

## 2023-07-06 DIAGNOSIS — M47.816 ARTHRITIS, LUMBAR SPINE: ICD-10-CM

## 2023-07-18 ENCOUNTER — OFFICE VISIT (OUTPATIENT)
Dept: PRIMARY CARE | Facility: CLINIC | Age: 73
End: 2023-07-18
Payer: MEDICARE

## 2023-07-18 VITALS
HEIGHT: 61 IN | WEIGHT: 206 LBS | DIASTOLIC BLOOD PRESSURE: 76 MMHG | HEART RATE: 90 BPM | SYSTOLIC BLOOD PRESSURE: 115 MMHG | BODY MASS INDEX: 38.89 KG/M2

## 2023-07-18 DIAGNOSIS — M54.12 CERVICAL RADICULOPATHY: ICD-10-CM
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
## 2023-07-18 DIAGNOSIS — E78.5 DYSLIPIDEMIA: ICD-10-CM

## 2023-07-18 DIAGNOSIS — E66.01 CLASS 2 SEVERE OBESITY WITH SERIOUS COMORBIDITY AND BODY MASS INDEX (BMI) OF 38.0 TO 38.9 IN ADULT, UNSPECIFIED OBESITY TYPE (MULTI): ICD-10-CM

## 2023-07-18 DIAGNOSIS — I10 PRIMARY HYPERTENSION: ICD-10-CM

## 2023-07-18 DIAGNOSIS — Z00.00 ROUTINE GENERAL MEDICAL EXAMINATION AT A HEALTH CARE FACILITY: Primary | ICD-10-CM

## 2023-07-18 PROCEDURE — 99214 OFFICE O/P EST MOD 30 MIN: CPT | Performed by: FAMILY MEDICINE

## 2023-07-18 PROCEDURE — 3078F DIAST BP <80 MM HG: CPT | Performed by: FAMILY MEDICINE

## 2023-07-18 PROCEDURE — 1036F TOBACCO NON-USER: CPT | Performed by: FAMILY MEDICINE

## 2023-07-18 PROCEDURE — 3008F BODY MASS INDEX DOCD: CPT | Performed by: FAMILY MEDICINE

## 2023-07-18 PROCEDURE — 3074F SYST BP LT 130 MM HG: CPT | Performed by: FAMILY MEDICINE

## 2023-07-18 PROCEDURE — 1159F MED LIST DOCD IN RCRD: CPT | Performed by: FAMILY MEDICINE

## 2023-07-18 PROCEDURE — 1125F AMNT PAIN NOTED PAIN PRSNT: CPT | Performed by: FAMILY MEDICINE

## 2023-07-18 PROCEDURE — 99397 PER PM REEVAL EST PAT 65+ YR: CPT | Performed by: FAMILY MEDICINE

## 2023-07-18 RX ORDER — METHOCARBAMOL 500 MG/1
500 TABLET, FILM COATED ORAL 4 TIMES DAILY PRN
Qty: 40 TABLET | Refills: 0 | Status: SHIPPED | OUTPATIENT
Start: 2023-07-18 | End: 2023-09-16

## 2023-07-18 RX ORDER — PNEUMOCOCCAL 20-VALENT CONJUGATE VACCINE 2.2; 2.2; 2.2; 2.2; 2.2; 2.2; 2.2; 2.2; 2.2; 2.2; 2.2; 2.2; 2.2; 2.2; 2.2; 2.2; 4.4; 2.2; 2.2; 2.2 UG/.5ML; UG/.5ML; UG/.5ML; UG/.5ML; UG/.5ML; UG/.5ML; UG/.5ML; UG/.5ML; UG/.5ML; UG/.5ML; UG/.5ML; UG/.5ML; UG/.5ML; UG/.5ML; UG/.5ML; UG/.5ML; UG/.5ML; UG/.5ML; UG/.5ML; UG/.5ML
0.5 INJECTION, SUSPENSION INTRAMUSCULAR ONCE
Qty: 0.5 ML | Refills: 0 | Status: SHIPPED | OUTPATIENT
Start: 2023-07-18 | End: 2023-07-18

## 2023-07-18 RX ORDER — AMITRIPTYLINE HYDROCHLORIDE 10 MG/1
TABLET, FILM COATED ORAL
COMMUNITY
Start: 2023-07-17 | End: 2023-10-19 | Stop reason: ALTCHOICE

## 2023-07-18 ASSESSMENT — PATIENT HEALTH QUESTIONNAIRE - PHQ9
2. FEELING DOWN, DEPRESSED OR HOPELESS: NOT AT ALL
SUM OF ALL RESPONSES TO PHQ9 QUESTIONS 1 AND 2: 0
1. LITTLE INTEREST OR PLEASURE IN DOING THINGS: NOT AT ALL

## 2023-07-19 PROBLEM — Z00.00 ROUTINE GENERAL MEDICAL EXAMINATION AT A HEALTH CARE FACILITY: Status: ACTIVE | Noted: 2023-07-19

## 2023-07-19 ASSESSMENT — ENCOUNTER SYMPTOMS
CARDIOVASCULAR NEGATIVE: 1
ALLERGIC/IMMUNOLOGIC NEGATIVE: 1
ENDOCRINE NEGATIVE: 1
HEMATOLOGIC/LYMPHATIC NEGATIVE: 1
CONSTITUTIONAL NEGATIVE: 1
GASTROINTESTINAL NEGATIVE: 1
NEUROLOGICAL NEGATIVE: 1
RESPIRATORY NEGATIVE: 1
NECK PAIN: 1
EYES NEGATIVE: 1

## 2023-07-19 NOTE — ASSESSMENT & PLAN NOTE
Discussed modifiable ( diet , exercise, weight ) and non modifiable ( Age, family history) risk factors HTN .  Encouraged to stay physically active , healthy eating habits - limit red meat intake, more of plant based diet, freshly prepared foods etc.,    Adverse effects of untreated hypertension discussed which include CAD/MI, CVA ,  CKD etc.,

## 2023-07-19 NOTE — PROGRESS NOTES
Subjective   Patient ID: Ana Lira is a 73 y.o. female who presents for Follow-up (3 month   hip PT scheduled in [place]).  Today she is accompanied by alone.     Neck.  Specially to the right side states she reads a lot and is looking down most of the day denies any recent injuries  Blood pressure controlled  Trying to lose weight  Abdominal pain improved taking Gaviscon  Has a small cyst on the right thumb which has been painful    Neck Pain     Continues to have pain entire right side of body since fall.   Current Outpatient Medications on File Prior to Visit   Medication Sig Dispense Refill    amitriptyline (Elavil) 10 mg tablet       atorvastatin (Lipitor) 20 mg tablet Take 1 tablet daily  APOT TAB 90 tablet 3    dicyclomine (Bentyl) 20 mg tablet TAKE 1 TABLET EVERY 6 HOURS AS NEEDED.      ergocalciferol (Vitamin D-2) 1.25 MG (51929 UT) capsule TAKE 1 CAPSULE Weekly      lansoprazole (Prevacid) 30 mg DR capsule Take 1 capsule (30 mg) by mouth once daily in the morning. Take before meals. Do not crush or chew. 90 capsule 0    lisinopriL-hydrochlorothiazide 20-25 mg tablet Take 1 tablet by mouth once daily. 90 tablet 0    naloxone (Narcan) 4 mg/0.1 mL nasal spray Apply 1 spray in each nostril AS NEEDED for OPIATE OVERDOSE or shallow breathing/ severe sedation.      potassium chloride CR (Klor-Con M20) 20 mEq ER tablet Take 2 tablets (40 mEq) by mouth once daily. Do not crush or chew. 60 tablet 0    traMADol (Ultram) 50 mg tablet TAKE 1 TABLET 3 TIMES DAILY AS NEEDED.       No current facility-administered medications on file prior to visit.        Review of Systems   Constitutional: Negative.    HENT: Negative.     Eyes: Negative.    Respiratory: Negative.     Cardiovascular: Negative.    Gastrointestinal: Negative.    Endocrine: Negative.    Genitourinary: Negative.    Musculoskeletal:  Positive for neck pain.   Allergic/Immunologic: Negative.    Neurological: Negative.    Hematological: Negative.    Right sided  "body pain    Objective   Blood Pressure 115/76   Pulse 90   Height 1.549 m (5' 1\")   Weight 93.4 kg (206 lb)   Body Mass Index 38.92 kg/m²   BSA: 2 meters squared  Growth percentiles: Facility age limit for growth %tl is 20 years. Facility age limit for growth %tl is 20 years.   No visits with results within 1 Week(s) from this visit.   Latest known visit with results is:   Clinical Support on 05/16/2023   Component Date Value Ref Range Status    Albumin 05/16/2023 3.9  3.4 - 5.0 g/dL Final    Total Bilirubin 05/16/2023 0.6  0.0 - 1.2 mg/dL Final    Bilirubin, Direct 05/16/2023 0.1  0.0 - 0.3 mg/dL Final    Alkaline Phosphatase 05/16/2023 66  33 - 136 U/L Final    ALT (SGPT) 05/16/2023 27  7 - 45 U/L Final     Patients treated with Sulfasalazine may generate    falsely decreased results for ALT.    AST 05/16/2023 35  9 - 39 U/L Final    Total Protein 05/16/2023 6.5  6.4 - 8.2 g/dL Final    GGT 05/16/2023 25  5 - 55 U/L Final      Physical Exam  Vitals and nursing note reviewed.   Constitutional:       Appearance: Normal appearance.   HENT:      Head: Normocephalic and atraumatic.      Right Ear: Tympanic membrane normal.      Left Ear: Tympanic membrane normal.      Nose: Nose normal.      Mouth/Throat:      Mouth: Mucous membranes are moist.   Eyes:      Pupils: Pupils are equal, round, and reactive to light.   Cardiovascular:      Rate and Rhythm: Normal rate and regular rhythm.      Pulses: Normal pulses.      Heart sounds: Normal heart sounds.   Pulmonary:      Effort: Pulmonary effort is normal.      Breath sounds: Normal breath sounds.   Abdominal:      General: Abdomen is flat. Bowel sounds are normal.      Palpations: Abdomen is soft.   Musculoskeletal:         General: Normal range of motion.      Cervical back: Normal range of motion and neck supple.   Skin:     General: Skin is warm and dry.      Capillary Refill: Capillary refill takes less than 2 seconds.   Neurological:      General: No focal " deficit present.      Mental Status: She is alert and oriented to person, place, and time.   Psychiatric:         Mood and Affect: Mood normal.     Tenderness paraspinal muscles lumbar amd cervical parspinal muscles    Assessment/Plan   Problem List Items Addressed This Visit       BP (high blood pressure)     Discussed modifiable ( diet , exercise, weight ) and non modifiable ( Age, family history) risk factors HTN .  Encouraged to stay physically active , healthy eating habits - limit red meat intake, more of plant based diet, freshly prepared foods etc.,    Adverse effects of untreated hypertension discussed which include CAD/MI, CVA ,  CKD etc.,          Cervical radiculopathy    Relevant Medications    methocarbamol (Robaxin) 500 mg tablet    Class 2 severe obesity with serious comorbidity in adult (CMS/[facility])     patient is advised to lose weight by reducing calorie intake and increasing activity levels, especially aerobic exercise          Dyslipidemia     Check lipid panel continue medications continue healthy eating work out  150 minutes a week            Fall    Relevant Orders    Referral to Physical Therapy    Routine general medical examination at a health care facility - Primary     [unfilled]    Assessment/Plan   Problem List Items Addressed This Visit       BP (high blood pressure)     Discussed modifiable ( diet , exercise, weight ) and non modifiable ( Age, family history) risk factors HTN .  Encouraged to stay physically active , healthy eating habits - limit red meat intake, more of plant based diet, freshly prepared foods etc.,    Adverse effects of untreated hypertension discussed which include CAD/MI, CVA ,  CKD etc.,          Cervical radiculopathy    Relevant Medications    methocarbamol (Robaxin) 500 mg tablet    Class 2 severe obesity with serious comorbidity in adult (CMS/[facility])     patient is advised to lose weight by reducing calorie intake and increasing activity levels, especially aerobic  exercise          Dyslipidemia     Check lipid panel continue medications continue healthy eating work out  150 minutes a week            Fall    Relevant Orders    Referral to Physical Therapy    Routine general medical examination at a health care facility - Primary

## 2023-07-25 DIAGNOSIS — I10 HYPERTENSION, UNSPECIFIED TYPE: ICD-10-CM

## 2023-07-25 RX ORDER — LISINOPRIL AND HYDROCHLOROTHIAZIDE 20; 25 MG/1; MG/1
1 TABLET ORAL DAILY
Qty: 90 TABLET | Refills: 1 | Status: SHIPPED | OUTPATIENT
Start: 2023-07-25 | End: 2024-01-12 | Stop reason: SINTOL

## 2023-08-21 DIAGNOSIS — E87.6 POTASSIUM DEFICIENCY: ICD-10-CM

## 2023-08-21 RX ORDER — POTASSIUM CHLORIDE 20 MEQ/1
40 TABLET, EXTENDED RELEASE ORAL DAILY
Qty: 180 TABLET | Refills: 1 | Status: SHIPPED | OUTPATIENT
Start: 2023-08-21

## 2023-09-05 ENCOUNTER — OFFICE VISIT (OUTPATIENT)
Dept: PRIMARY CARE | Facility: CLINIC | Age: 73
End: 2023-09-05
Payer: MEDICARE

## 2023-09-05 VITALS
HEART RATE: 89 BPM | HEIGHT: 61 IN | DIASTOLIC BLOOD PRESSURE: 85 MMHG | BODY MASS INDEX: 38.51 KG/M2 | SYSTOLIC BLOOD PRESSURE: 121 MMHG | WEIGHT: 204 LBS

## 2023-09-05 DIAGNOSIS — E55.9 VITAMIN D DEFICIENCY: ICD-10-CM

## 2023-09-05 DIAGNOSIS — T63.441A BEE STING, ACCIDENTAL OR UNINTENTIONAL, INITIAL ENCOUNTER: Primary | ICD-10-CM

## 2023-09-05 PROBLEM — K29.70 GASTRITIS: Status: ACTIVE | Noted: 2023-09-05

## 2023-09-05 PROCEDURE — 3008F BODY MASS INDEX DOCD: CPT | Performed by: FAMILY MEDICINE

## 2023-09-05 PROCEDURE — 99214 OFFICE O/P EST MOD 30 MIN: CPT | Performed by: FAMILY MEDICINE

## 2023-09-05 PROCEDURE — 1159F MED LIST DOCD IN RCRD: CPT | Performed by: FAMILY MEDICINE

## 2023-09-05 PROCEDURE — 3079F DIAST BP 80-89 MM HG: CPT | Performed by: FAMILY MEDICINE

## 2023-09-05 PROCEDURE — 3074F SYST BP LT 130 MM HG: CPT | Performed by: FAMILY MEDICINE

## 2023-09-05 PROCEDURE — 1125F AMNT PAIN NOTED PAIN PRSNT: CPT | Performed by: FAMILY MEDICINE

## 2023-09-05 PROCEDURE — 1036F TOBACCO NON-USER: CPT | Performed by: FAMILY MEDICINE

## 2023-09-05 RX ORDER — ERGOCALCIFEROL 1.25 MG/1
CAPSULE ORAL
Qty: 12 CAPSULE | Refills: 1 | Status: SHIPPED | OUTPATIENT
Start: 2023-09-05

## 2023-09-05 RX ORDER — METHYLPREDNISOLONE 4 MG/1
TABLET ORAL
Qty: 21 TABLET | Refills: 0 | Status: SHIPPED | OUTPATIENT
Start: 2023-09-05 | End: 2023-09-12

## 2023-09-10 PROBLEM — T63.441A BEE STING: Status: ACTIVE | Noted: 2023-09-10

## 2023-09-10 ASSESSMENT — ENCOUNTER SYMPTOMS
NEUROLOGICAL NEGATIVE: 1
ALLERGIC/IMMUNOLOGIC NEGATIVE: 1
HEMATOLOGIC/LYMPHATIC NEGATIVE: 1
NAUSEA: 0
PSYCHIATRIC NEGATIVE: 1
VOMITING: 0
FEVER: 0
GASTROINTESTINAL NEGATIVE: 1
CARDIOVASCULAR NEGATIVE: 1
RESPIRATORY NEGATIVE: 1
CHILLS: 0
MUSCULOSKELETAL NEGATIVE: 1
ENDOCRINE NEGATIVE: 1

## 2023-09-11 NOTE — PROGRESS NOTES
Subjective   Patient ID: Ana Lira is a 73 y.o. female who presents for Insect Bite (Bee sting) and Med Management.      HPI patient was bit by a bee has had redness swelling pain  Current Outpatient Medications on File Prior to Visit   Medication Sig Dispense Refill    atorvastatin (Lipitor) 20 mg tablet Take 1 tablet daily  APOT TAB 90 tablet 3    lansoprazole (Prevacid) 30 mg DR capsule Take 1 capsule (30 mg) by mouth once daily in the morning. Take before meals. Do not crush or chew. 90 capsule 0    lisinopriL-hydrochlorothiazide 20-25 mg tablet Take 1 tablet by mouth once daily. 90 tablet 1    methocarbamol (Robaxin) 500 mg tablet Take 1 tablet (500 mg) by mouth 4 times a day as needed for muscle spasms. 40 tablet 0    naloxone (Narcan) 4 mg/0.1 mL nasal spray Apply 1 spray in each nostril AS NEEDED for OPIATE OVERDOSE or shallow breathing/ severe sedation.      potassium chloride CR (Klor-Con M20) 20 mEq ER tablet Take 2 tablets (40 mEq) by mouth once daily. Do not crush or chew. 180 tablet 1    [DISCONTINUED] ergocalciferol (Vitamin D-2) 1.25 MG (71648 UT) capsule TAKE 1 CAPSULE Weekly      [DISCONTINUED] traMADol (Ultram) 50 mg tablet TAKE 1 TABLET 3 TIMES DAILY AS NEEDED.      amitriptyline (Elavil) 10 mg tablet       dicyclomine (Bentyl) 20 mg tablet TAKE 1 TABLET EVERY 6 HOURS AS NEEDED.       No current facility-administered medications on file prior to visit.        Review of Systems   Constitutional:  Negative for chills and fever.   HENT: Negative.     Respiratory: Negative.     Cardiovascular: Negative.    Gastrointestinal: Negative.  Negative for nausea and vomiting.   Endocrine: Negative.    Genitourinary: Negative.    Musculoskeletal: Negative.    Skin: Negative.  Negative for rash.   Allergic/Immunologic: Negative.    Neurological: Negative.    Hematological: Negative.    Psychiatric/Behavioral: Negative.     All other systems reviewed and are negative.      Objective   Blood Pressure 121/85   " Pulse 89   Height 1.549 m (5' 1\")   Weight 92.5 kg (204 lb)   Body Mass Index 38.55 kg/m²   BSA: 2 meters squared  Growth percentiles: Facility age limit for growth %tl is 20 years. Facility age limit for growth %tl is 20 years.   No visits with results within 1 Week(s) from this visit.   Latest known visit with results is:   Clinical Support on 05/16/2023   Component Date Value Ref Range Status    Albumin 05/16/2023 3.9  3.4 - 5.0 g/dL Final    Total Bilirubin 05/16/2023 0.6  0.0 - 1.2 mg/dL Final    Bilirubin, Direct 05/16/2023 0.1  0.0 - 0.3 mg/dL Final    Alkaline Phosphatase 05/16/2023 66  33 - 136 U/L Final    ALT (SGPT) 05/16/2023 27  7 - 45 U/L Final     Patients treated with Sulfasalazine may generate    falsely decreased results for ALT.    AST 05/16/2023 35  9 - 39 U/L Final    Total Protein 05/16/2023 6.5  6.4 - 8.2 g/dL Final    GGT 05/16/2023 25  5 - 55 U/L Final      Physical Exam  Vitals and nursing note reviewed.   Constitutional:       Appearance: Normal appearance.   HENT:      Head: Normocephalic and atraumatic.      Right Ear: Tympanic membrane normal.      Left Ear: Tympanic membrane normal.      Nose: Nose normal.      Mouth/Throat:      Mouth: Mucous membranes are moist.   Eyes:      Pupils: Pupils are equal, round, and reactive to light.   Cardiovascular:      Rate and Rhythm: Normal rate and regular rhythm.      Pulses: Normal pulses.      Heart sounds: Normal heart sounds.   Pulmonary:      Effort: Pulmonary effort is normal.      Breath sounds: Normal breath sounds.   Abdominal:      General: Abdomen is flat. Bowel sounds are normal.      Palpations: Abdomen is soft.   Musculoskeletal:         General: Normal range of motion.      Cervical back: Normal range of motion and neck supple.   Skin:     General: Skin is warm and dry.      Capillary Refill: Capillary refill takes less than 2 seconds.   Neurological:      General: No focal deficit present.      Mental Status: She is alert " and oriented to person, place, and time.   Psychiatric:         Mood and Affect: Mood normal.         Assessment/Plan   Problem List Items Addressed This Visit       Vitamin D deficiency    Relevant Medications    ergocalciferol (Vitamin D-2) 1.25 MG (52445 UT) capsule    methylPREDNISolone (Medrol Dospak) 4 mg tablets    Bee sting - Primary    Relevant Medications    methylPREDNISolone (Medrol Dospak) 4 mg tablets

## 2023-10-19 ENCOUNTER — LAB (OUTPATIENT)
Dept: LAB | Facility: LAB | Age: 73
End: 2023-10-19
Payer: MEDICARE

## 2023-10-19 ENCOUNTER — OFFICE VISIT (OUTPATIENT)
Dept: PRIMARY CARE | Facility: CLINIC | Age: 73
End: 2023-10-19
Payer: MEDICARE

## 2023-10-19 VITALS
WEIGHT: 205 LBS | DIASTOLIC BLOOD PRESSURE: 73 MMHG | HEIGHT: 61 IN | SYSTOLIC BLOOD PRESSURE: 112 MMHG | BODY MASS INDEX: 38.71 KG/M2

## 2023-10-19 DIAGNOSIS — E66.01 CLASS 2 SEVERE OBESITY WITH SERIOUS COMORBIDITY AND BODY MASS INDEX (BMI) OF 38.0 TO 38.9 IN ADULT, UNSPECIFIED OBESITY TYPE (MULTI): ICD-10-CM

## 2023-10-19 DIAGNOSIS — Z23 FLU VACCINE NEED: ICD-10-CM

## 2023-10-19 DIAGNOSIS — I10 PRIMARY HYPERTENSION: Primary | ICD-10-CM

## 2023-10-19 DIAGNOSIS — E78.5 DYSLIPIDEMIA: ICD-10-CM

## 2023-10-19 DIAGNOSIS — E55.9 VITAMIN D DEFICIENCY: ICD-10-CM

## 2023-10-19 DIAGNOSIS — I10 PRIMARY HYPERTENSION: ICD-10-CM

## 2023-10-19 DIAGNOSIS — E87.6 HYPOKALEMIA: ICD-10-CM

## 2023-10-19 DIAGNOSIS — G62.9 PERIPHERAL NERVE DISEASE: ICD-10-CM

## 2023-10-19 LAB
25(OH)D3 SERPL-MCNC: 52 NG/ML (ref 30–100)
ALBUMIN SERPL BCP-MCNC: 3.9 G/DL (ref 3.4–5)
ALP SERPL-CCNC: 68 U/L (ref 33–136)
ALT SERPL W P-5'-P-CCNC: 19 U/L (ref 7–45)
ANION GAP SERPL CALC-SCNC: 13 MMOL/L (ref 10–20)
AST SERPL W P-5'-P-CCNC: 23 U/L (ref 9–39)
BILIRUB SERPL-MCNC: 0.6 MG/DL (ref 0–1.2)
BUN SERPL-MCNC: 12 MG/DL (ref 6–23)
CALCIUM SERPL-MCNC: 9.1 MG/DL (ref 8.6–10.6)
CHLORIDE SERPL-SCNC: 105 MMOL/L (ref 98–107)
CHOLEST SERPL-MCNC: 237 MG/DL (ref 0–199)
CHOLESTEROL/HDL RATIO: 3.1
CO2 SERPL-SCNC: 29 MMOL/L (ref 21–32)
CREAT SERPL-MCNC: 0.92 MG/DL (ref 0.5–1.05)
ERYTHROCYTE [DISTWIDTH] IN BLOOD BY AUTOMATED COUNT: 13.4 % (ref 11.5–14.5)
GFR SERPL CREATININE-BSD FRML MDRD: 66 ML/MIN/1.73M*2
GLUCOSE SERPL-MCNC: 97 MG/DL (ref 74–99)
HCT VFR BLD AUTO: 43.5 % (ref 36–46)
HDLC SERPL-MCNC: 76.6 MG/DL
HGB BLD-MCNC: 13.9 G/DL (ref 12–16)
LDLC SERPL CALC-MCNC: 144 MG/DL
MCH RBC QN AUTO: 30.2 PG (ref 26–34)
MCHC RBC AUTO-ENTMCNC: 32 G/DL (ref 32–36)
MCV RBC AUTO: 95 FL (ref 80–100)
NON HDL CHOLESTEROL: 160 MG/DL (ref 0–149)
NRBC BLD-RTO: 0 /100 WBCS (ref 0–0)
PLATELET # BLD AUTO: 224 X10*3/UL (ref 150–450)
PMV BLD AUTO: 11.5 FL (ref 7.5–11.5)
POC APPEARANCE, URINE: CLEAR
POC BILIRUBIN, URINE: NEGATIVE
POC BLOOD, URINE: NEGATIVE
POC COLOR, URINE: YELLOW
POC GLUCOSE, URINE: NEGATIVE MG/DL
POC KETONES, URINE: NEGATIVE MG/DL
POC LEUKOCYTES, URINE: NEGATIVE
POC NITRITE,URINE: NEGATIVE
POC PH, URINE: 6.5 PH
POC PROTEIN, URINE: NEGATIVE MG/DL
POC SPECIFIC GRAVITY, URINE: 1.02
POC UROBILINOGEN, URINE: 0.2 EU/DL
POTASSIUM SERPL-SCNC: 3.9 MMOL/L (ref 3.5–5.3)
PROT SERPL-MCNC: 6.7 G/DL (ref 6.4–8.2)
RBC # BLD AUTO: 4.6 X10*6/UL (ref 4–5.2)
SODIUM SERPL-SCNC: 143 MMOL/L (ref 136–145)
TRIGL SERPL-MCNC: 84 MG/DL (ref 0–149)
TSH SERPL-ACNC: 2.03 MIU/L (ref 0.44–3.98)
VIT B12 SERPL-MCNC: 411 PG/ML (ref 211–911)
VLDL: 17 MG/DL (ref 0–40)
WBC # BLD AUTO: 5.7 X10*3/UL (ref 4.4–11.3)

## 2023-10-19 PROCEDURE — 3008F BODY MASS INDEX DOCD: CPT | Performed by: FAMILY MEDICINE

## 2023-10-19 PROCEDURE — 85027 COMPLETE CBC AUTOMATED: CPT

## 2023-10-19 PROCEDURE — 3074F SYST BP LT 130 MM HG: CPT | Performed by: FAMILY MEDICINE

## 2023-10-19 PROCEDURE — 84443 ASSAY THYROID STIM HORMONE: CPT

## 2023-10-19 PROCEDURE — 80061 LIPID PANEL: CPT

## 2023-10-19 PROCEDURE — 82306 VITAMIN D 25 HYDROXY: CPT

## 2023-10-19 PROCEDURE — 1159F MED LIST DOCD IN RCRD: CPT | Performed by: FAMILY MEDICINE

## 2023-10-19 PROCEDURE — 81002 URINALYSIS NONAUTO W/O SCOPE: CPT | Performed by: FAMILY MEDICINE

## 2023-10-19 PROCEDURE — 1036F TOBACCO NON-USER: CPT | Performed by: FAMILY MEDICINE

## 2023-10-19 PROCEDURE — 99214 OFFICE O/P EST MOD 30 MIN: CPT | Performed by: FAMILY MEDICINE

## 2023-10-19 PROCEDURE — 82607 VITAMIN B-12: CPT

## 2023-10-19 PROCEDURE — 3078F DIAST BP <80 MM HG: CPT | Performed by: FAMILY MEDICINE

## 2023-10-19 PROCEDURE — 80053 COMPREHEN METABOLIC PANEL: CPT

## 2023-10-19 PROCEDURE — 1125F AMNT PAIN NOTED PAIN PRSNT: CPT | Performed by: FAMILY MEDICINE

## 2023-10-19 PROCEDURE — 36415 COLL VENOUS BLD VENIPUNCTURE: CPT

## 2023-10-21 PROBLEM — Z23 FLU VACCINE NEED: Status: ACTIVE | Noted: 2023-10-21

## 2023-10-21 ASSESSMENT — ENCOUNTER SYMPTOMS
CHILLS: 0
ALLERGIC/IMMUNOLOGIC NEGATIVE: 1
FEVER: 0
MUSCULOSKELETAL NEGATIVE: 1
PSYCHIATRIC NEGATIVE: 1
VOMITING: 0
GASTROINTESTINAL NEGATIVE: 1
RESPIRATORY NEGATIVE: 1
NAUSEA: 0
NEUROLOGICAL NEGATIVE: 1
ENDOCRINE NEGATIVE: 1
HEMATOLOGIC/LYMPHATIC NEGATIVE: 1
APPETITE CHANGE: 1

## 2023-10-21 NOTE — PROGRESS NOTES
Subjective   Patient ID: Ana Lira is a 73 y.o. female who presents for Follow-up.      HPI he has not been feeling good blood pressure control doing physical therapy which is helping with pain denies chest pain shortness of breath but just does not feel right  Current Outpatient Medications on File Prior to Visit   Medication Sig Dispense Refill    atorvastatin (Lipitor) 20 mg tablet Take 1 tablet daily  APOT TAB 90 tablet 3    ergocalciferol (Vitamin D-2) 1.25 MG (40208 UT) capsule TAKE 1 CAPSULE Weekly 12 capsule 1    lansoprazole (Prevacid) 30 mg DR capsule Take 1 capsule (30 mg) by mouth once daily in the morning. Take before meals. Do not crush or chew. 90 capsule 0    lisinopriL-hydrochlorothiazide 20-25 mg tablet Take 1 tablet by mouth once daily. 90 tablet 1    potassium chloride CR (Klor-Con M20) 20 mEq ER tablet Take 2 tablets (40 mEq) by mouth once daily. Do not crush or chew. 180 tablet 1    methocarbamol (Robaxin) 500 mg tablet Take 1 tablet (500 mg) by mouth 4 times a day as needed for muscle spasms. 40 tablet 0    [DISCONTINUED] amitriptyline (Elavil) 10 mg tablet       [DISCONTINUED] dicyclomine (Bentyl) 20 mg tablet TAKE 1 TABLET EVERY 6 HOURS AS NEEDED.      [DISCONTINUED] naloxone (Narcan) 4 mg/0.1 mL nasal spray Apply 1 spray in each nostril AS NEEDED for OPIATE OVERDOSE or shallow breathing/ severe sedation.       No current facility-administered medications on file prior to visit.        Review of Systems   Constitutional:  Positive for appetite change. Negative for chills and fever.   HENT: Negative.     Respiratory: Negative.     Gastrointestinal: Negative.  Negative for nausea and vomiting.   Endocrine: Negative.    Genitourinary: Negative.    Musculoskeletal: Negative.    Skin: Negative.  Negative for rash.   Allergic/Immunologic: Negative.    Neurological: Negative.    Hematological: Negative.    Psychiatric/Behavioral: Negative.     All other systems reviewed and are  "negative.      Objective   Blood Pressure 112/73   Height 1.549 m (5' 1\")   Weight 93 kg (205 lb)   Body Mass Index 38.73 kg/m²   BSA: 2 meters squared  Growth percentiles: Facility age limit for growth %tl is 20 years. Facility age limit for growth %tl is 20 years.   Lab on 10/19/2023   Component Date Value Ref Range Status    Vitamin D, 25-Hydroxy, Total 10/19/2023 52  30 - 100 ng/mL Final    Thyroid Stimulating Hormone 10/19/2023 2.03  0.44 - 3.98 mIU/L Final    Cholesterol 10/19/2023 237 (H)  0 - 199 mg/dL Final          Age      Desirable   Borderline High   High     0-19 Y     0 - 169       170 - 199     >/= 200    20-24 Y     0 - 189       190 - 224     >/= 225         >24 Y     0 - 199       200 - 239     >/= 240   **All ranges are based on fasting samples. Specific   therapeutic targets will vary based on patient-specific   cardiac risk.    Pediatric guidelines reference:Pediatrics 2011, 128(S5).Adult guidelines reference: NCEP ATPIII Guidelines,MELCHOR 2001, 258:2486-97    Venipuncture immediately after or during the administration of Metamizole may lead to falsely low results. Testing should be performed immediately prior to Metamizole dosing.    HDL-Cholesterol 10/19/2023 76.6  mg/dL Final      Age       Very Low   Low     Normal    High    0-19 Y    < 35      < 40     40-45     ----  20-24 Y    ----     < 40      >45      ----        >24 Y      ----     < 40     40-60      >60      Cholesterol/HDL Ratio 10/19/2023 3.1   Final      Ref Values  Desirable  < 3.4  High Risk  > 5.0    LDL Calculated 10/19/2023 144 (H)  <=99 mg/dL Final                                Near   Borderline      AGE      Desirable  Optimal    High     High     Very High     0-19 Y     0 - 109     ---    110-129   >/= 130     ----    20-24 Y     0 - 119     ---    120-159   >/= 160     ----      >24 Y     0 -  99   100-129  130-159   160-189     >/=190      VLDL 10/19/2023 17  0 - 40 mg/dL Final    Triglycerides 10/19/2023 84  0 " - 149 mg/dL Final       Age         Desirable   Borderline High   High     Very High   0 D-90 D    19 - 174         ----         ----        ----  91 D- 9 Y     0 -  74        75 -  99     >/= 100      ----    10-19 Y     0 -  89        90 - 129     >/= 130      ----    20-24 Y     0 - 114       115 - 149     >/= 150      ----         >24 Y     0 - 149       150 - 199    200- 499    >/= 500    Venipuncture immediately after or during the administration of Metamizole may lead to falsely low results. Testing should be performed immediately prior to Metamizole dosing.    Non HDL Cholesterol 10/19/2023 160 (H)  0 - 149 mg/dL Final          Age       Desirable   Borderline High   High     Very High     0-19 Y     0 - 119       120 - 144     >/= 145    >/= 160    20-24 Y     0 - 149       150 - 189     >/= 190      ----         >24 Y    30 mg/dL above LDL Cholesterol goal      Glucose 10/19/2023 97  74 - 99 mg/dL Final    Sodium 10/19/2023 143  136 - 145 mmol/L Final    Potassium 10/19/2023 3.9  3.5 - 5.3 mmol/L Final    Chloride 10/19/2023 105  98 - 107 mmol/L Final    Bicarbonate 10/19/2023 29  21 - 32 mmol/L Final    Anion Gap 10/19/2023 13  10 - 20 mmol/L Final    Urea Nitrogen 10/19/2023 12  6 - 23 mg/dL Final    Creatinine 10/19/2023 0.92  0.50 - 1.05 mg/dL Final    eGFR 10/19/2023 66  >60 mL/min/1.73m*2 Final    Calculations of estimated GFR are performed using the 2021 CKD-EPI Study Refit equation without the race variable for the IDMS-Traceable creatinine methods.  https://jasn.asnjournals.org/content/early/2021/09/22/ASN.9763693724    Calcium 10/19/2023 9.1  8.6 - 10.6 mg/dL Final    Albumin 10/19/2023 3.9  3.4 - 5.0 g/dL Final    Alkaline Phosphatase 10/19/2023 68  33 - 136 U/L Final    Total Protein 10/19/2023 6.7  6.4 - 8.2 g/dL Final    AST 10/19/2023 23  9 - 39 U/L Final    Bilirubin, Total 10/19/2023 0.6  0.0 - 1.2 mg/dL Final    ALT 10/19/2023 19  7 - 45 U/L Final    Patients treated with Sulfasalazine  may generate falsely decreased results for ALT.    WBC 10/19/2023 5.7  4.4 - 11.3 x10*3/uL Final    nRBC 10/19/2023 0.0  0.0 - 0.0 /100 WBCs Final    RBC 10/19/2023 4.60  4.00 - 5.20 x10*6/uL Final    Hemoglobin 10/19/2023 13.9  12.0 - 16.0 g/dL Final    Hematocrit 10/19/2023 43.5  36.0 - 46.0 % Final    MCV 10/19/2023 95  80 - 100 fL Final    MCH 10/19/2023 30.2  26.0 - 34.0 pg Final    MCHC 10/19/2023 32.0  32.0 - 36.0 g/dL Final    RDW 10/19/2023 13.4  11.5 - 14.5 % Final    Platelets 10/19/2023 224  150 - 450 x10*3/uL Final    MPV 10/19/2023 11.5  7.5 - 11.5 fL Final    Vitamin B12 10/19/2023 411  211 - 911 pg/mL Final   Office Visit on 10/19/2023   Component Date Value Ref Range Status    POC Color, Urine 10/19/2023 Yellow  Straw, Yellow, Light-Yellow Final    POC Appearance, Urine 10/19/2023 Clear  Clear Final    POC Specific Gravity, Urine 10/19/2023 1.020  1.005 - 1.035 Final    POC PH, Urine 10/19/2023 6.5  No Reference Range Established PH Final    POC Protein, Urine 10/19/2023 NEGATIVE  NEGATIVE, 30 (1+) mg/dl Final    POC Glucose, Urine 10/19/2023 NEGATIVE  NEGATIVE mg/dl Final    POC Blood, Urine 10/19/2023 NEGATIVE  NEGATIVE Final    POC Ketones, Urine 10/19/2023 NEGATIVE  NEGATIVE mg/dl Final    POC Bilirubin, Urine 10/19/2023 NEGATIVE  NEGATIVE Final    POC Urobilinogen, Urine 10/19/2023 0.2  0.2, 1.0 EU/DL Final    Poc Nitrate, Urine 10/19/2023 NEGATIVE  NEGATIVE Final    POC Leukocytes, Urine 10/19/2023 NEGATIVE  NEGATIVE Final      Physical Exam  Vitals and nursing note reviewed.   Constitutional:       Appearance: Normal appearance.   HENT:      Head: Normocephalic and atraumatic.      Right Ear: Tympanic membrane normal.      Left Ear: Tympanic membrane normal.      Nose: Nose normal.      Mouth/Throat:      Mouth: Mucous membranes are moist.   Eyes:      Pupils: Pupils are equal, round, and reactive to light.   Cardiovascular:      Rate and Rhythm: Normal rate and regular rhythm.      Pulses:  Normal pulses.      Heart sounds: Normal heart sounds.   Pulmonary:      Effort: Pulmonary effort is normal.      Breath sounds: Normal breath sounds.   Abdominal:      General: Abdomen is flat. Bowel sounds are normal.      Palpations: Abdomen is soft.   Musculoskeletal:         General: Normal range of motion.      Cervical back: Normal range of motion and neck supple.   Skin:     General: Skin is warm and dry.      Capillary Refill: Capillary refill takes less than 2 seconds.   Neurological:      General: No focal deficit present.      Mental Status: She is alert and oriented to person, place, and time.   Psychiatric:         Mood and Affect: Mood normal.         Assessment/Plan   Problem List Items Addressed This Visit             ICD-10-CM    BP (high blood pressure) - Primary I10    Relevant Orders    Vitamin D 25-Hydroxy,Total (for eval of Vitamin D levels) (Completed)    TSH with reflex to Free T4 if abnormal (Completed)    Lipid Panel (Completed)    Comprehensive Metabolic Panel (Completed)    CBC (Completed)    Vitamin B12 (Completed)    POCT UA (nonautomated) manually resulted (Completed)    Class 2 severe obesity with serious comorbidity in adult (CMS/HCC) E66.01    Dyslipidemia E78.5    Relevant Orders    Vitamin D 25-Hydroxy,Total (for eval of Vitamin D levels) (Completed)    TSH with reflex to Free T4 if abnormal (Completed)    Lipid Panel (Completed)    Comprehensive Metabolic Panel (Completed)    CBC (Completed)    Vitamin B12 (Completed)    POCT UA (nonautomated) manually resulted (Completed)    Hypokalemia E87.6    Relevant Orders    Vitamin D 25-Hydroxy,Total (for eval of Vitamin D levels) (Completed)    TSH with reflex to Free T4 if abnormal (Completed)    Lipid Panel (Completed)    Comprehensive Metabolic Panel (Completed)    CBC (Completed)    Vitamin B12 (Completed)    Peripheral nerve disease G62.9    Relevant Orders    Vitamin D 25-Hydroxy,Total (for eval of Vitamin D levels) (Completed)     TSH with reflex to Free T4 if abnormal (Completed)    Lipid Panel (Completed)    Comprehensive Metabolic Panel (Completed)    CBC (Completed)    Vitamin B12 (Completed)    Vitamin D deficiency E55.9    Relevant Orders    Vitamin D 25-Hydroxy,Total (for eval of Vitamin D levels) (Completed)    TSH with reflex to Free T4 if abnormal (Completed)    Lipid Panel (Completed)    Comprehensive Metabolic Panel (Completed)    CBC (Completed)    Vitamin B12 (Completed)    Flu vaccine need Z23    Relevant Orders    Vitamin D 25-Hydroxy,Total (for eval of Vitamin D levels) (Completed)    TSH with reflex to Free T4 if abnormal (Completed)    Lipid Panel (Completed)    Comprehensive Metabolic Panel (Completed)    CBC (Completed)    Vitamin B12 (Completed)

## 2023-10-23 ENCOUNTER — TELEPHONE (OUTPATIENT)
Dept: PRIMARY CARE | Facility: CLINIC | Age: 73
End: 2023-10-23
Payer: MEDICARE

## 2023-10-24 ENCOUNTER — APPOINTMENT (OUTPATIENT)
Dept: PRIMARY CARE | Facility: CLINIC | Age: 73
End: 2023-10-24
Payer: MEDICARE

## 2023-11-08 ENCOUNTER — ANCILLARY PROCEDURE (OUTPATIENT)
Dept: CARDIOLOGY | Facility: CLINIC | Age: 73
End: 2023-11-08
Payer: MEDICARE

## 2023-11-08 DIAGNOSIS — R00.2 PALPITATION: ICD-10-CM

## 2023-11-08 PROCEDURE — 93248 EXT ECG>7D<15D REV&INTERPJ: CPT | Performed by: INTERNAL MEDICINE

## 2023-11-08 PROCEDURE — 93246 EXT ECG>7D<15D RECORDING: CPT

## 2023-11-10 ENCOUNTER — CLINICAL SUPPORT (OUTPATIENT)
Dept: PRIMARY CARE | Facility: CLINIC | Age: 73
End: 2023-11-10
Payer: MEDICARE

## 2023-11-10 DIAGNOSIS — Z23 INFLUENZA VACCINE ADMINISTERED: ICD-10-CM

## 2023-11-10 PROCEDURE — G0008 ADMIN INFLUENZA VIRUS VAC: HCPCS | Performed by: FAMILY MEDICINE

## 2023-11-10 PROCEDURE — 90686 IIV4 VACC NO PRSV 0.5 ML IM: CPT | Performed by: FAMILY MEDICINE

## 2024-01-02 DIAGNOSIS — K25.9 GASTRIC ULCER, UNSPECIFIED CHRONICITY, UNSPECIFIED WHETHER GASTRIC ULCER HEMORRHAGE OR PERFORATION PRESENT: ICD-10-CM

## 2024-01-02 RX ORDER — LANSOPRAZOLE 30 MG/1
30 CAPSULE, DELAYED RELEASE ORAL
Qty: 90 CAPSULE | Refills: 0 | Status: SHIPPED | OUTPATIENT
Start: 2024-01-02 | End: 2024-03-26 | Stop reason: SDUPTHER

## 2024-01-12 ENCOUNTER — OFFICE VISIT (OUTPATIENT)
Dept: PRIMARY CARE | Facility: CLINIC | Age: 74
End: 2024-01-12
Payer: MEDICARE

## 2024-01-12 VITALS
SYSTOLIC BLOOD PRESSURE: 126 MMHG | BODY MASS INDEX: 38.14 KG/M2 | DIASTOLIC BLOOD PRESSURE: 75 MMHG | HEART RATE: 92 BPM | HEIGHT: 61 IN | WEIGHT: 202 LBS

## 2024-01-12 DIAGNOSIS — N18.31 STAGE 3A CHRONIC KIDNEY DISEASE (MULTI): ICD-10-CM

## 2024-01-12 DIAGNOSIS — Z00.00 ROUTINE GENERAL MEDICAL EXAMINATION AT A HEALTH CARE FACILITY: ICD-10-CM

## 2024-01-12 DIAGNOSIS — J44.1 ACUTE EXACERBATION OF CHRONIC OBSTRUCTIVE PULMONARY DISEASE (MULTI): ICD-10-CM

## 2024-01-12 DIAGNOSIS — R00.2 PALPITATIONS: ICD-10-CM

## 2024-01-12 DIAGNOSIS — R07.2 PRECORDIAL PAIN: ICD-10-CM

## 2024-01-12 DIAGNOSIS — Z13.89 SCREENING FOR MULTIPLE CONDITIONS: ICD-10-CM

## 2024-01-12 DIAGNOSIS — E78.5 DYSLIPIDEMIA: ICD-10-CM

## 2024-01-12 DIAGNOSIS — M54.12 CERVICAL RADICULOPATHY: ICD-10-CM

## 2024-01-12 DIAGNOSIS — Z00.00 ROUTINE GENERAL MEDICAL EXAMINATION AT HEALTH CARE FACILITY: Primary | ICD-10-CM

## 2024-01-12 DIAGNOSIS — E66.01 CLASS 2 SEVERE OBESITY WITH SERIOUS COMORBIDITY AND BODY MASS INDEX (BMI) OF 38.0 TO 38.9 IN ADULT, UNSPECIFIED OBESITY TYPE (MULTI): ICD-10-CM

## 2024-01-12 DIAGNOSIS — I10 PRIMARY HYPERTENSION: ICD-10-CM

## 2024-01-12 LAB
ALBUMIN SERPL BCP-MCNC: 3.9 G/DL (ref 3.4–5)
ALP SERPL-CCNC: 61 U/L (ref 33–136)
ALT SERPL W P-5'-P-CCNC: 16 U/L (ref 7–45)
ANION GAP SERPL CALC-SCNC: 12 MMOL/L (ref 10–20)
AST SERPL W P-5'-P-CCNC: 24 U/L (ref 9–39)
BILIRUB SERPL-MCNC: 0.8 MG/DL (ref 0–1.2)
BUN SERPL-MCNC: 11 MG/DL (ref 6–23)
CALCIUM SERPL-MCNC: 9.1 MG/DL (ref 8.6–10.6)
CHLORIDE SERPL-SCNC: 104 MMOL/L (ref 98–107)
CHOLEST SERPL-MCNC: 182 MG/DL (ref 0–199)
CHOLESTEROL/HDL RATIO: 2.2
CO2 SERPL-SCNC: 28 MMOL/L (ref 21–32)
CREAT SERPL-MCNC: 0.85 MG/DL (ref 0.5–1.05)
EGFRCR SERPLBLD CKD-EPI 2021: 73 ML/MIN/1.73M*2
GLUCOSE SERPL-MCNC: 89 MG/DL (ref 74–99)
HDLC SERPL-MCNC: 81.5 MG/DL
LDLC SERPL CALC-MCNC: 89 MG/DL
NON HDL CHOLESTEROL: 101 MG/DL (ref 0–149)
POTASSIUM SERPL-SCNC: 3.9 MMOL/L (ref 3.5–5.3)
PROT SERPL-MCNC: 6.7 G/DL (ref 6.4–8.2)
SODIUM SERPL-SCNC: 140 MMOL/L (ref 136–145)
TRIGL SERPL-MCNC: 60 MG/DL (ref 0–149)
VLDL: 12 MG/DL (ref 0–40)

## 2024-01-12 PROCEDURE — 3078F DIAST BP <80 MM HG: CPT | Performed by: FAMILY MEDICINE

## 2024-01-12 PROCEDURE — G0447 BEHAVIOR COUNSEL OBESITY 15M: HCPCS | Performed by: FAMILY MEDICINE

## 2024-01-12 PROCEDURE — 1036F TOBACCO NON-USER: CPT | Performed by: FAMILY MEDICINE

## 2024-01-12 PROCEDURE — 1125F AMNT PAIN NOTED PAIN PRSNT: CPT | Performed by: FAMILY MEDICINE

## 2024-01-12 PROCEDURE — 36415 COLL VENOUS BLD VENIPUNCTURE: CPT

## 2024-01-12 PROCEDURE — 1159F MED LIST DOCD IN RCRD: CPT | Performed by: FAMILY MEDICINE

## 2024-01-12 PROCEDURE — 80053 COMPREHEN METABOLIC PANEL: CPT

## 2024-01-12 PROCEDURE — 1170F FXNL STATUS ASSESSED: CPT | Performed by: FAMILY MEDICINE

## 2024-01-12 PROCEDURE — G0439 PPPS, SUBSEQ VISIT: HCPCS | Performed by: FAMILY MEDICINE

## 2024-01-12 PROCEDURE — 80061 LIPID PANEL: CPT

## 2024-01-12 PROCEDURE — 3008F BODY MASS INDEX DOCD: CPT | Performed by: FAMILY MEDICINE

## 2024-01-12 PROCEDURE — 99497 ADVNCD CARE PLAN 30 MIN: CPT | Performed by: FAMILY MEDICINE

## 2024-01-12 PROCEDURE — 99214 OFFICE O/P EST MOD 30 MIN: CPT | Performed by: FAMILY MEDICINE

## 2024-01-12 PROCEDURE — 3074F SYST BP LT 130 MM HG: CPT | Performed by: FAMILY MEDICINE

## 2024-01-12 RX ORDER — LISINOPRIL AND HYDROCHLOROTHIAZIDE 12.5; 2 MG/1; MG/1
1 TABLET ORAL DAILY
Qty: 90 TABLET | Refills: 1 | Status: SHIPPED | OUTPATIENT
Start: 2024-01-12 | End: 2024-06-11 | Stop reason: SDUPTHER

## 2024-01-12 RX ORDER — PNEUMOCOCCAL 20-VALENT CONJUGATE VACCINE 2.2; 2.2; 2.2; 2.2; 2.2; 2.2; 2.2; 2.2; 2.2; 2.2; 2.2; 2.2; 2.2; 2.2; 2.2; 2.2; 4.4; 2.2; 2.2; 2.2 UG/.5ML; UG/.5ML; UG/.5ML; UG/.5ML; UG/.5ML; UG/.5ML; UG/.5ML; UG/.5ML; UG/.5ML; UG/.5ML; UG/.5ML; UG/.5ML; UG/.5ML; UG/.5ML; UG/.5ML; UG/.5ML; UG/.5ML; UG/.5ML; UG/.5ML; UG/.5ML
0.5 INJECTION, SUSPENSION INTRAMUSCULAR ONCE
Qty: 0.5 ML | Refills: 0 | Status: SHIPPED | OUTPATIENT
Start: 2024-01-12 | End: 2024-01-12

## 2024-01-12 ASSESSMENT — ENCOUNTER SYMPTOMS
GASTROINTESTINAL NEGATIVE: 1
ALLERGIC/IMMUNOLOGIC NEGATIVE: 1
RESPIRATORY NEGATIVE: 1
PSYCHIATRIC NEGATIVE: 1
PALPITATIONS: 1
NEUROLOGICAL NEGATIVE: 1
CHILLS: 0
DEPRESSION: 0
VOMITING: 0
HEMATOLOGIC/LYMPHATIC NEGATIVE: 1
MUSCULOSKELETAL NEGATIVE: 1
OCCASIONAL FEELINGS OF UNSTEADINESS: 0
LOSS OF SENSATION IN FEET: 0
FEVER: 0
ENDOCRINE NEGATIVE: 1
NAUSEA: 0

## 2024-01-12 ASSESSMENT — ACTIVITIES OF DAILY LIVING (ADL)
GROCERY_SHOPPING: INDEPENDENT
TAKING_MEDICATION: INDEPENDENT
DOING_HOUSEWORK: INDEPENDENT
DRESSING: INDEPENDENT
BATHING: INDEPENDENT
MANAGING_FINANCES: INDEPENDENT

## 2024-01-12 ASSESSMENT — PATIENT HEALTH QUESTIONNAIRE - PHQ9
1. LITTLE INTEREST OR PLEASURE IN DOING THINGS: NOT AT ALL
2. FEELING DOWN, DEPRESSED OR HOPELESS: NOT AT ALL
SUM OF ALL RESPONSES TO PHQ9 QUESTIONS 1 AND 2: 0

## 2024-01-13 NOTE — PROGRESS NOTES
"Subjective   Reason for Visit: Ana Lira is an 73 y.o. female here for a Medicare Wellness visit.     Past Medical, Surgical, and Family History reviewed and updated in chart.    Reviewed all medications by prescribing practitioner or clinical pharmacist (such as prescriptions, OTCs, herbal therapies and supplements) and documented in the medical record.    HPI  Here for follow up   HTN has been voiding freqiuntly woild like to cut back on hydrochlorothiazide  H:D flavio statin  Palpitaions improved. Holter monitro SVT    Patient Care Team:  Taurus Espinosa MD as PCP - General     Review of Systems   Constitutional:  Negative for chills and fever.   HENT: Negative.     Respiratory: Negative.     Cardiovascular:  Positive for palpitations.   Gastrointestinal: Negative.  Negative for nausea and vomiting.   Endocrine: Negative.    Genitourinary: Negative.    Musculoskeletal: Negative.    Skin: Negative.  Negative for rash.   Allergic/Immunologic: Negative.    Neurological: Negative.    Hematological: Negative.    Psychiatric/Behavioral: Negative.     All other systems reviewed and are negative.      Objective   Vitals:  Blood Pressure 126/75   Pulse 92   Height 1.549 m (5' 1\")   Weight 91.6 kg (202 lb)   Body Mass Index 38.17 kg/m²       Physical Exam  HENT:      Head: Normocephalic and atraumatic.   Cardiovascular:      Rate and Rhythm: Normal rate and regular rhythm.   Pulmonary:      Effort: Pulmonary effort is normal.      Breath sounds: Normal breath sounds.   Musculoskeletal:         General: Normal range of motion.      Cervical back: Normal range of motion.   Skin:     General: Skin is warm and dry.   Neurological:      Mental Status: She is disoriented.      Gait: Gait abnormal.       patient is advised to lose weight by reducing calorie intake and increasing activity levels, especially aerobic exercise    Advance Care Planning Note     Discussion Date: 01/12/24   Discussion Participants: Patient  Advance " Care Planning was discussed today and the following is a brief summary of our discussion.   Patient has capacity to make their own medical decisions: Yes  Health Care Agent/Surrogate Decision Maker documented in chart: Yes,   (See Problem List: Advance Directive Discussion for details)    Documents on file and valid:  Advance Directive/Living Will: No   Health Care Power of : No      Communication of Medical Status/Prognosis, Treatment Goals/Options, Treatment Decisions: Yes  Goals of Care: survival is prioritized, if goals for quality or survival can reasonably be achieved   Follow Up Plan: Annual Discussion  Time Statement: Total face to face time spent counseling, including the explanation on advance care planning, was 16 minutes.    Taurus Espinosa MD      Assessment/Plan   Problem List Items Addressed This Visit       BP (high blood pressure)    Relevant Medications    lisinopriL-hydrochlorothiazide 20-12.5 mg tablet    Cervical radiculopathy    Current Assessment & Plan     C/w PT         CKD (chronic kidney disease), stage III (CMS/[facility])    Current Assessment & Plan     Will check labs         Class 2 severe obesity with serious comorbidity in adult (CMS/[facility])    Dyslipidemia    Relevant Orders    Lipid Panel    Comprehensive Metabolic Panel    Palpitations    Routine general medical examination at a health care facility    Current Assessment & Plan     -Nutrition: Stressed importance of moderation in sodium/caffeine intake, saturated fat and cholesterol, caloric balance, sufficient intake of fresh fruits, vegetables, fiber, ---Exercise: Stressed the importance of regular exercise.   --Injury prevention: Discussed safety belts, safety helmets, smoke detector, smoking near bedding or upholstery.   --Dental health: Discussed importance of regular tooth brushing, flossing, and dental visits.  --Immunizations reviewed.  --Discussed benefits of screening colonoscopy.  --After hours service discussed with  patient           Relevant Medications    pneumoc 20-michelle conj-dip cr,PF, (Prevnar 20, PF,) 0.5 mL vaccine    Acute exacerbation of chronic obstructive pulmonary disease (CMS/HCC)    Current Assessment & Plan     Patient mtz snot have COPD         Precordial pain    Relevant Orders    CT cardiac scoring wo IV contrast    Screening for multiple conditions    Routine general medical examination at health care facility - Primary

## 2024-02-21 ENCOUNTER — APPOINTMENT (OUTPATIENT)
Dept: RADIOLOGY | Facility: HOSPITAL | Age: 74
End: 2024-02-21
Payer: MEDICARE

## 2024-02-21 ENCOUNTER — APPOINTMENT (OUTPATIENT)
Dept: CARDIOLOGY | Facility: HOSPITAL | Age: 74
End: 2024-02-21
Payer: MEDICARE

## 2024-02-21 ENCOUNTER — HOSPITAL ENCOUNTER (EMERGENCY)
Facility: HOSPITAL | Age: 74
Discharge: HOME | End: 2024-02-21
Attending: EMERGENCY MEDICINE
Payer: MEDICARE

## 2024-02-21 VITALS
BODY MASS INDEX: 36.8 KG/M2 | RESPIRATION RATE: 22 BRPM | TEMPERATURE: 97.5 F | SYSTOLIC BLOOD PRESSURE: 123 MMHG | HEART RATE: 87 BPM | OXYGEN SATURATION: 98 % | WEIGHT: 200 LBS | HEIGHT: 62 IN | DIASTOLIC BLOOD PRESSURE: 72 MMHG

## 2024-02-21 DIAGNOSIS — R11.2 NAUSEA AND VOMITING, UNSPECIFIED VOMITING TYPE: ICD-10-CM
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
## 2024-02-21 DIAGNOSIS — D32.9 MENINGIOMA (MULTI): ICD-10-CM

## 2024-02-21 DIAGNOSIS — E04.1 THYROID NODULE: ICD-10-CM

## 2024-02-21 LAB
ALBUMIN SERPL BCP-MCNC: 4.1 G/DL (ref 3.4–5)
ALP SERPL-CCNC: 63 U/L (ref 33–136)
ALT SERPL W P-5'-P-CCNC: 22 U/L (ref 7–45)
ANION GAP SERPL CALC-SCNC: 16 MMOL/L (ref 10–20)
AST SERPL W P-5'-P-CCNC: 38 U/L (ref 9–39)
BASOPHILS # BLD AUTO: 0.02 X10*3/UL (ref 0–0.1)
BASOPHILS NFR BLD AUTO: 0.2 %
BILIRUB SERPL-MCNC: 0.9 MG/DL (ref 0–1.2)
BUN SERPL-MCNC: 14 MG/DL (ref 6–23)
CALCIUM SERPL-MCNC: 8.8 MG/DL (ref 8.6–10.3)
CHLORIDE SERPL-SCNC: 103 MMOL/L (ref 98–107)
CO2 SERPL-SCNC: 25 MMOL/L (ref 21–32)
CREAT SERPL-MCNC: 0.91 MG/DL (ref 0.5–1.05)
EGFRCR SERPLBLD CKD-EPI 2021: 67 ML/MIN/1.73M*2
EOSINOPHIL # BLD AUTO: 0.15 X10*3/UL (ref 0–0.4)
EOSINOPHIL NFR BLD AUTO: 1.7 %
ERYTHROCYTE [DISTWIDTH] IN BLOOD BY AUTOMATED COUNT: 13.2 % (ref 11.5–14.5)
GLUCOSE SERPL-MCNC: 90 MG/DL (ref 74–99)
HCT VFR BLD AUTO: 45.7 % (ref 36–46)
HGB BLD-MCNC: 14.8 G/DL (ref 12–16)
IMM GRANULOCYTES # BLD AUTO: 0.03 X10*3/UL (ref 0–0.5)
IMM GRANULOCYTES NFR BLD AUTO: 0.3 % (ref 0–0.9)
LIPASE SERPL-CCNC: 22 U/L (ref 9–82)
LYMPHOCYTES # BLD AUTO: 0.73 X10*3/UL (ref 0.8–3)
LYMPHOCYTES NFR BLD AUTO: 8.1 %
MCH RBC QN AUTO: 31 PG (ref 26–34)
MCHC RBC AUTO-ENTMCNC: 32.4 G/DL (ref 32–36)
MCV RBC AUTO: 96 FL (ref 80–100)
MONOCYTES # BLD AUTO: 0.68 X10*3/UL (ref 0.05–0.8)
MONOCYTES NFR BLD AUTO: 7.6 %
NEUTROPHILS # BLD AUTO: 7.37 X10*3/UL (ref 1.6–5.5)
NEUTROPHILS NFR BLD AUTO: 82.1 %
NRBC BLD-RTO: 0 /100 WBCS (ref 0–0)
PLATELET # BLD AUTO: 200 X10*3/UL (ref 150–450)
POTASSIUM SERPL-SCNC: 4.3 MMOL/L (ref 3.5–5.3)
PROT SERPL-MCNC: 7.5 G/DL (ref 6.4–8.2)
RBC # BLD AUTO: 4.78 X10*6/UL (ref 4–5.2)
SODIUM SERPL-SCNC: 140 MMOL/L (ref 136–145)
WBC # BLD AUTO: 9 X10*3/UL (ref 4.4–11.3)

## 2024-02-21 PROCEDURE — 72125 CT NECK SPINE W/O DYE: CPT

## 2024-02-21 PROCEDURE — 83690 ASSAY OF LIPASE: CPT | Performed by: EMERGENCY MEDICINE

## 2024-02-21 PROCEDURE — 96361 HYDRATE IV INFUSION ADD-ON: CPT

## 2024-02-21 PROCEDURE — 2500000004 HC RX 250 GENERAL PHARMACY W/ HCPCS (ALT 636 FOR OP/ED): Performed by: EMERGENCY MEDICINE

## 2024-02-21 PROCEDURE — 72125 CT NECK SPINE W/O DYE: CPT | Performed by: RADIOLOGY

## 2024-02-21 PROCEDURE — 85025 COMPLETE CBC W/AUTO DIFF WBC: CPT | Performed by: EMERGENCY MEDICINE

## 2024-02-21 PROCEDURE — 80053 COMPREHEN METABOLIC PANEL: CPT | Performed by: EMERGENCY MEDICINE

## 2024-02-21 PROCEDURE — 70450 CT HEAD/BRAIN W/O DYE: CPT

## 2024-02-21 PROCEDURE — 96374 THER/PROPH/DIAG INJ IV PUSH: CPT

## 2024-02-21 PROCEDURE — 36415 COLL VENOUS BLD VENIPUNCTURE: CPT | Performed by: EMERGENCY MEDICINE

## 2024-02-21 PROCEDURE — 99285 EMERGENCY DEPT VISIT HI MDM: CPT | Mod: 25

## 2024-02-21 PROCEDURE — 70450 CT HEAD/BRAIN W/O DYE: CPT | Performed by: RADIOLOGY

## 2024-02-21 PROCEDURE — 93005 ELECTROCARDIOGRAM TRACING: CPT

## 2024-02-21 RX ORDER — ONDANSETRON HYDROCHLORIDE 2 MG/ML
4 INJECTION, SOLUTION INTRAVENOUS ONCE
Status: COMPLETED | OUTPATIENT
Start: 2024-02-21 | End: 2024-02-21

## 2024-02-21 RX ORDER — ONDANSETRON 4 MG/1
4 TABLET, FILM COATED ORAL EVERY 6 HOURS
Qty: 12 TABLET | Refills: 0 | Status: SHIPPED | OUTPATIENT
Start: 2024-02-21 | End: 2024-02-24

## 2024-02-21 RX ADMIN — SODIUM CHLORIDE, POTASSIUM CHLORIDE, SODIUM LACTATE AND CALCIUM CHLORIDE 1000 ML: 600; 310; 30; 20 INJECTION, SOLUTION INTRAVENOUS at 21:49

## 2024-02-21 RX ADMIN — ONDANSETRON 4 MG: 2 INJECTION INTRAMUSCULAR; INTRAVENOUS at 21:49

## 2024-02-21 ASSESSMENT — COLUMBIA-SUICIDE SEVERITY RATING SCALE - C-SSRS
6. HAVE YOU EVER DONE ANYTHING, STARTED TO DO ANYTHING, OR PREPARED TO DO ANYTHING TO END YOUR LIFE?: NO
2. HAVE YOU ACTUALLY HAD ANY THOUGHTS OF KILLING YOURSELF?: NO
1. IN THE PAST MONTH, HAVE YOU WISHED YOU WERE DEAD OR WISHED YOU COULD GO TO SLEEP AND NOT WAKE UP?: NO

## 2024-02-21 ASSESSMENT — PAIN SCALES - GENERAL: PAINLEVEL_OUTOF10: 6

## 2024-02-22 NOTE — ED TRIAGE NOTES
Pt was at the [facility] where she vomited and fell out of her chair unknown if she LOC.

## 2024-02-22 NOTE — ED PROVIDER NOTES
Limitations to History: None  Additional History Obtained from: EMS    HPI:    Patient is presenting to the emergency department after having a syncopal episode versus a fall while at the [facility].  Patient states that she was in her normal state of health today when she went to the [facility].  She began feeling nauseated, had episodes of vomiting and then she states that she was on the ground.  She states that she felt dizzy and lightheaded prior to this event.  Denied any chest pain or palpitations.  Now she states that she is feeling back to her baseline.  Denies any changes in her bowel movements.  Denies any recent fevers or chills.  Had nausea and diaphoresis during the episode.  Denies any headaches, changes in vision or hearing, numbness or tingling.  Her review of systems is otherwise negative.    ------------------------------------------------------------------------------------------------------------------------------------------  Physical Exam:    ED Triage Vitals [02/21/24 2021]   Temperature Heart Rate Respirations BP   36.4 °C (97.5 °F) 81 18 125/80      Pulse Ox Temp Source Heart Rate Source Patient Position   100 % Oral Monitor Sitting      BP Location FiO2 (%)     Left arm --        VS: As documented in the triage note and EMR flowsheet from this visit were reviewed.  General: Well appearing. No acute distress.   Eyes: Pupils round and reactive. No scleral icterus. No conjunctival injection  HENT: Atraumatic. Normocephalic. Moist mucous membranes. Trachea midline  CV: RRR, No MRG. No pedal edema appreciated.  Resp: Clear to auscultation bilaterally. Non-labored.    GI: Soft, nontender to palpation. Nondistended. No guarding, rigidity or rebound  Skin: Warm, dry, intact. No systemic rashes or lesions appreciated.  Extremities: No deformities or pain out of proportion; pulses intact   Neuro: Alert. No focal motor or sensory deficits observed. Speech fluent. Answers questions appropriately.   Psych:  Appropriate. Kempt.    ------------------------------------------------------------------------------------------------------------------------------------------    Medical Decision Making  Patient is presenting to the emergency department after syncopal episode at the [facility].  Due to the patient's episode occurring after having vomiting concerns for vasovagal event.  Due to the patient being within 6 hours and having a syncopal episode with vomiting will obtain CT imaging to evaluate for any intracranial bleed.  Due to the patient being less than 6 hours after the event CT would be appropriate to evaluate for subarachnoid.  Patient without severe headache making this less likely.  Has a nonfocal neurologic exam.  EKG nonischemic.  Will plan labs and reevaluation.  Imaging findings were discussed with the patient and family at the bedside.  They expressed understanding of the need for discharge with outpatient follow-up and management.  She has a continued nonfocal neurologic exam and is feeling improved after IV fluids and antiemetics in the ED.  Patient stable for discharge home with outpatient management.  Risk and benefit of hospitalization discussed with the patient and family who are in agreement with outpatient follow-up with her family doctor.    EKG reviewed and interpreted by me  Normal sinus rhythm, LVH, nonspecific ST changes, no ST elevations, depressions or T wave inversions concerning for ischemia        Objective Data  I have independently interpreted the following labs, imaging studies and MDM added to ED Course  Labs Reviewed   CBC WITH AUTO DIFFERENTIAL - Abnormal       Result Value    WBC 9.0      nRBC 0.0      RBC 4.78      Hemoglobin 14.8      Hematocrit 45.7      MCV 96      MCH 31.0      MCHC 32.4      RDW 13.2      Platelets 200      Neutrophils % 82.1      Immature Granulocytes %, Automated 0.3      Lymphocytes % 8.1      Monocytes % 7.6      Eosinophils % 1.7      Basophils % 0.2       Neutrophils Absolute 7.37 (*)     Immature Granulocytes Absolute, Automated 0.03      Lymphocytes Absolute 0.73 (*)     Monocytes Absolute 0.68      Eosinophils Absolute 0.15      Basophils Absolute 0.02     COMPREHENSIVE METABOLIC PANEL - Normal    Glucose 90      Sodium 140      Potassium 4.3      Chloride 103      Bicarbonate 25      Anion Gap 16      Urea Nitrogen 14      Creatinine 0.91      eGFR 67      Calcium 8.8      Albumin 4.1      Alkaline Phosphatase 63      Total Protein 7.5      AST 38      Bilirubin, Total 0.9      ALT 22     LIPASE - Normal    Lipase 22      Narrative:     Venipuncture immediately after or during the administration of Metamizole may lead to falsely low results. Testing should be performed immediately prior to Metamizole dosing.       CT head wo IV contrast   Final Result   1. Left posterior fossa partial calcified mass as above similar to   previous exam suggestive of partial calcified meningioma. This is   incompletely characterized in this study. The size has slightly   increased when compared to previous exam from 10/23/2015 from 18 mm   in diameter to 20 mm in diameter in the current study. No significant   midline shift or cerebellar edema is seen. Further evaluation with   MRI can be performed as clinically warranted for better assessment.        2. No evidence for acute intracranial hemorrhage or acute cortical   infarction.        MACRO:   None             Signed by: Veronica Bonilla 2/21/2024 9:54 PM   Dictation workstation:   [id number]      CT cervical spine wo IV contrast   Final Result   1. No evidence for an acute fracture or subluxation of the cervical   spine.   2. Discogenic degenerative changes at C4-5 associated with mild   spinal canal stenosis.   3. Heterogeneous appearance of the thyroid gland concerning for   thyroid nodules although evaluation is limited in this study.   Correlation with thyroid function tests is recommended. Further   evaluation with  ultrasound can be performed for better assessment as   clinically warranted.        MACRO:   None        Signed by: Veronica Bonilla 2/21/2024 9:56 PM   Dictation workstation:   [id number]          ED Course  ED Course as of 02/26/24 1852 Wed Feb 21, 2024 2217 Imaging reviewed, no acute findings, chronic meningioma noted that is calcified. Thyroid nodules noted, will have patient follow up with pcp for the same.  [LP]      ED Course User Index  [LP] Leah Yang DO         Diagnoses as of 02/26/24 1852   Fall, initial encounter   Thyroid nodule   Meningioma (CMS/HCC)   Nausea and vomiting, unspecified vomiting type       Procedure  Procedures    Disposition: discharged    Leah Yang DO  Emergency Medicine  Medical Toxicology     Leah Yang DO  02/26/24 1854

## 2024-02-24 DIAGNOSIS — M54.12 CERVICAL RADICULOPATHY: Primary | ICD-10-CM

## 2024-02-24 LAB
ATRIAL RATE: 85 BPM
P AXIS: 55 DEGREES
P OFFSET: 196 MS
P ONSET: 139 MS
PR INTERVAL: 160 MS
Q ONSET: 219 MS
QRS COUNT: 14 BEATS
QRS DURATION: 68 MS
QT INTERVAL: 396 MS
QTC CALCULATION(BAZETT): 471 MS
QTC FREDERICIA: 444 MS
R AXIS: -4 DEGREES
T AXIS: 7 DEGREES
T OFFSET: 417 MS
VENTRICULAR RATE: 85 BPM

## 2024-02-24 RX ORDER — METHYLPREDNISOLONE 4 MG/1
TABLET ORAL
Qty: 21 TABLET | Refills: 0 | Status: SHIPPED | OUTPATIENT
Start: 2024-02-24 | End: 2024-02-26 | Stop reason: ALTCHOICE

## 2024-02-26 ENCOUNTER — OFFICE VISIT (OUTPATIENT)
Dept: PRIMARY CARE | Facility: CLINIC | Age: 74
End: 2024-02-26
Payer: MEDICARE

## 2024-02-26 VITALS
WEIGHT: 203 LBS | BODY MASS INDEX: 38.33 KG/M2 | HEART RATE: 78 BPM | HEIGHT: 61 IN | DIASTOLIC BLOOD PRESSURE: 62 MMHG | SYSTOLIC BLOOD PRESSURE: 111 MMHG

## 2024-02-26 DIAGNOSIS — M54.2 NECK PAIN ON RIGHT SIDE: ICD-10-CM

## 2024-02-26 DIAGNOSIS — M54.12 CERVICAL RADICULOPATHY: ICD-10-CM

## 2024-02-26 DIAGNOSIS — E01.0 THYROMEGALY: ICD-10-CM

## 2024-02-26 DIAGNOSIS — D32.9 MENINGIOMA (MULTI): Primary | ICD-10-CM

## 2024-02-26 PROCEDURE — 3078F DIAST BP <80 MM HG: CPT | Performed by: FAMILY MEDICINE

## 2024-02-26 PROCEDURE — 1160F RVW MEDS BY RX/DR IN RCRD: CPT | Performed by: FAMILY MEDICINE

## 2024-02-26 PROCEDURE — 1125F AMNT PAIN NOTED PAIN PRSNT: CPT | Performed by: FAMILY MEDICINE

## 2024-02-26 PROCEDURE — 3074F SYST BP LT 130 MM HG: CPT | Performed by: FAMILY MEDICINE

## 2024-02-26 PROCEDURE — 1036F TOBACCO NON-USER: CPT | Performed by: FAMILY MEDICINE

## 2024-02-26 PROCEDURE — 99214 OFFICE O/P EST MOD 30 MIN: CPT | Performed by: FAMILY MEDICINE

## 2024-02-26 PROCEDURE — 1159F MED LIST DOCD IN RCRD: CPT | Performed by: FAMILY MEDICINE

## 2024-02-26 PROCEDURE — 3008F BODY MASS INDEX DOCD: CPT | Performed by: FAMILY MEDICINE

## 2024-02-26 ASSESSMENT — ENCOUNTER SYMPTOMS
LOSS OF SENSATION IN FEET: 0
DEPRESSION: 0
OCCASIONAL FEELINGS OF UNSTEADINESS: 0

## 2024-02-26 ASSESSMENT — PATIENT HEALTH QUESTIONNAIRE - PHQ9
2. FEELING DOWN, DEPRESSED OR HOPELESS: NOT AT ALL
1. LITTLE INTEREST OR PLEASURE IN DOING THINGS: NOT AT ALL
SUM OF ALL RESPONSES TO PHQ9 QUESTIONS 1 AND 2: 0

## 2024-03-03 PROBLEM — D32.9 MENINGIOMA (MULTI): Status: ACTIVE | Noted: 2024-03-03

## 2024-03-03 PROBLEM — E01.0 THYROMEGALY: Status: ACTIVE | Noted: 2024-03-03

## 2024-03-04 NOTE — PROGRESS NOTES
Chief Complaint/HPI:  Patient was in the ER for neck pain had a CT which showed calcification possible meningioma also showed thyromegaly  Patient called this physician on-call over the weekend for neck pain was started on Medrol states pain is much better but concerned about calcification on the brain denies headaches      ROS otherwise negative aside from what was mentioned above in HPI.      Patient Active Problem List   Diagnosis    Chronic upper abdominal pain    BP (high blood pressure)    Carotid bruit    Cervical radiculopathy    CKD (chronic kidney disease), stage III (CMS/[facility])    Class 2 severe obesity with serious comorbidity in adult (CMS/[facility])    Colicky RUQ abdominal pain    Epigastric pain    Cyst of right kidney    Dental abscess    Dyslipidemia    Edema    Gastric ulcer    H. pylori infection    Helicobacter positive gastritis    Hernia, incisional    History of uterine cancer    Hypokalemia    Increased frequency of urination    Joint pain    Low back pain    Lumbar spondylosis    Neck pain on right side    Osteopenia    Palpitations    Peripheral nerve disease    Recurrent umbilical hernia    Rotator cuff tendinitis, right    Sprain, ankle joint, medial, left, sequela    Vitamin D deficiency    Numbness    Right hand pain    Essential hypertension    Fall    Routine general medical examination at a health care facility    Gastritis    Bee sting    Flu vaccine need    Acute exacerbation of chronic obstructive pulmonary disease (CMS/[facility])    Precordial pain    Screening for multiple conditions    Routine general medical examination at health care facility    Meningioma (CMS/[facility])    Thyromegaly     Past Medical History:   Diagnosis Date    Abdominal bloating 02/05/2023    Abdominal pain 02/05/2023    Bilateral impacted cerumen 02/05/2023    Cough 02/05/2023    COVID-19 virus infection 02/05/2023    Malignant neoplasm of body of uterus, unspecified site (CMS/[facility]) 04/13/2023    Personal history of  malignant neoplasm of other parts of uterus 2020    History of malignant neoplasm of other parts of uterus    Shoulder pain, right 2023    Uterine cancer (CMS/HCC) 2012     Past Surgical History:   Procedure Laterality Date    TOTAL ABDOMINAL HYSTERECTOMY W/ BILATERAL SALPINGOOPHORECTOMY  2016    Total Abdominal Hysterectomy With Removal Of Both Ovaries     Family History   Problem Relation Name Age of Onset    No Known Problems Mother      No Known Problems Father       Social History     Tobacco Use    Smoking status: Former     Types: Cigarettes     Quit date:      Years since quittin.1    Smokeless tobacco: Never   Substance Use Topics    Alcohol use: Yes    Drug use: Never         ALLERGIES  Allergies   Allergen Reactions    Codeine Other    Oxycodone-Acetaminophen Other         MEDICATIONS  Current Outpatient Medications   Medication Sig Dispense Refill    atorvastatin (Lipitor) 20 mg tablet Take 1 tablet daily  APOT TAB 90 tablet 3    ergocalciferol (Vitamin D-2) 1.25 MG (28886 UT) capsule TAKE 1 CAPSULE Weekly 12 capsule 1    lansoprazole (Prevacid) 30 mg DR capsule Take 1 capsule (30 mg) by mouth once daily in the morning. Take before meals. MUST MAKE APPT FOR FURTHER REFILLS 90 capsule 0    lisinopriL-hydrochlorothiazide 20-12.5 mg tablet Take 1 tablet by mouth once daily. 90 tablet 1    methocarbamol (Robaxin) 500 mg tablet Take 1 tablet (500 mg) by mouth 4 times a day as needed for muscle spasms. 40 tablet 0    potassium chloride CR (Klor-Con M20) 20 mEq ER tablet Take 2 tablets (40 mEq) by mouth once daily. Do not crush or chew. 180 tablet 1     No current facility-administered medications for this visit.         PHYSICAL EXAM  Visit Vitals  Blood Pressure 111/62   Pulse 78     Body mass index is 38.36 kg/m².  Gen: Alert, NAD  HEENT:  PERRLA, EOMI, conjunctiva and sclera normal in appearance  Respiratory:  Lungs CTAB  Cardiovascular:  Heart RRR. No M/R/G  Neuro:  Gross  motor and sensory intact  Skin:  No suspicious lesions present    ASSESSMENT/PLAN  Problem List Items Addressed This Visit       Cervical radiculopathy    Current Assessment & Plan     C/w PT         Neck pain on right side    Current Assessment & Plan     Patient was treated with prednisone helped the weekend patient will resume physical therapy once pain is improved and acute pain has resolved         Meningioma (CMS/HCC) - Primary    Current Assessment & Plan     Will get MRI of the brain to further evaluate calcification seen on CT to evaluate size of possible meningioma         Relevant Orders    MR brain w and wo IV contrast    Thyromegaly    Relevant Orders    US thyroid           Taurus Espinosa MD

## 2024-03-04 NOTE — ASSESSMENT & PLAN NOTE
Patient was treated with prednisone helped the weekend patient will resume physical therapy once pain is improved and acute pain has resolved

## 2024-03-04 NOTE — ASSESSMENT & PLAN NOTE
Will get MRI of the brain to further evaluate calcification seen on CT to evaluate size of possible meningioma

## 2024-03-14 ENCOUNTER — HOSPITAL ENCOUNTER (OUTPATIENT)
Dept: RADIOLOGY | Facility: HOSPITAL | Age: 74
Discharge: HOME | End: 2024-03-14
Payer: MEDICARE

## 2024-03-14 DIAGNOSIS — E01.0 THYROMEGALY: ICD-10-CM

## 2024-03-14 PROCEDURE — 2550000001 HC RX 255 CONTRASTS: Performed by: FAMILY MEDICINE

## 2024-03-14 PROCEDURE — 70553 MRI BRAIN STEM W/O & W/DYE: CPT | Performed by: RADIOLOGY

## 2024-03-14 PROCEDURE — 76536 US EXAM OF HEAD AND NECK: CPT

## 2024-03-14 PROCEDURE — 76536 US EXAM OF HEAD AND NECK: CPT | Performed by: RADIOLOGY

## 2024-03-14 PROCEDURE — 70553 MRI BRAIN STEM W/O & W/DYE: CPT

## 2024-03-14 PROCEDURE — A9575 INJ GADOTERATE MEGLUMI 0.1ML: HCPCS | Performed by: FAMILY MEDICINE

## 2024-03-14 RX ORDER — GADOTERATE MEGLUMINE 376.9 MG/ML
19 INJECTION INTRAVENOUS
Status: COMPLETED | OUTPATIENT
Start: 2024-03-14 | End: 2024-03-14

## 2024-03-14 RX ADMIN — GADOTERATE MEGLUMINE 19 ML: 376.9 INJECTION INTRAVENOUS at 10:26

## 2024-03-19 DIAGNOSIS — E04.1 THYROID NODULE: ICD-10-CM

## 2024-03-19 DIAGNOSIS — D32.9 MENINGIOMA (MULTI): Primary | ICD-10-CM

## 2024-03-26 DIAGNOSIS — K25.9 GASTRIC ULCER, UNSPECIFIED CHRONICITY, UNSPECIFIED WHETHER GASTRIC ULCER HEMORRHAGE OR PERFORATION PRESENT: ICD-10-CM

## 2024-03-26 RX ORDER — LANSOPRAZOLE 30 MG/1
30 CAPSULE, DELAYED RELEASE ORAL
Qty: 90 CAPSULE | Refills: 0 | Status: SHIPPED | OUTPATIENT
Start: 2024-03-26 | End: 2024-05-09 | Stop reason: SDUPTHER

## 2024-04-11 ENCOUNTER — OFFICE VISIT (OUTPATIENT)
Dept: NEUROSURGERY | Facility: CLINIC | Age: 74
End: 2024-04-11
Payer: MEDICARE

## 2024-04-11 VITALS
WEIGHT: 200 LBS | TEMPERATURE: 96.4 F | DIASTOLIC BLOOD PRESSURE: 74 MMHG | SYSTOLIC BLOOD PRESSURE: 113 MMHG | HEIGHT: 61 IN | BODY MASS INDEX: 37.76 KG/M2 | HEART RATE: 94 BPM

## 2024-04-11 DIAGNOSIS — D32.9 MENINGIOMA (MULTI): Primary | ICD-10-CM

## 2024-04-11 PROCEDURE — 99203 OFFICE O/P NEW LOW 30 MIN: CPT | Performed by: NURSE PRACTITIONER

## 2024-04-11 PROCEDURE — 1159F MED LIST DOCD IN RCRD: CPT | Performed by: NURSE PRACTITIONER

## 2024-04-11 PROCEDURE — 3008F BODY MASS INDEX DOCD: CPT | Performed by: NURSE PRACTITIONER

## 2024-04-11 PROCEDURE — 3074F SYST BP LT 130 MM HG: CPT | Performed by: NURSE PRACTITIONER

## 2024-04-11 PROCEDURE — 1125F AMNT PAIN NOTED PAIN PRSNT: CPT | Performed by: NURSE PRACTITIONER

## 2024-04-11 PROCEDURE — 3078F DIAST BP <80 MM HG: CPT | Performed by: NURSE PRACTITIONER

## 2024-04-11 PROCEDURE — 99213 OFFICE O/P EST LOW 20 MIN: CPT | Performed by: NURSE PRACTITIONER

## 2024-04-11 PROCEDURE — 1160F RVW MEDS BY RX/DR IN RCRD: CPT | Performed by: NURSE PRACTITIONER

## 2024-04-11 ASSESSMENT — PAIN SCALES - GENERAL: PAINLEVEL: 6

## 2024-04-11 NOTE — PROGRESS NOTES
"[facility]  Neurosurgery    History of Present Illness      Ana Lira is a 74-year-old female with a PMH significant for HTN, HLD, CKD III, renal cyst, lumbar spondylosis, OA, right sided sciatica, who presented for evaluation of syncopal event vs fall while at the [facility]. Patient developed nausea with episode of vomiting then woke up and she was on the ground. She did feel dizzy / lightheaded prior to passing out. A CTH was ordered and found to have a L posterior fossa partial calcified mass. MRI brain w/wo was completed on 03/14 and noted an extra-axial lesion along lateral aspect of L cerebellar hemisphere measuring 2.3 x 2.6 x 2.4cm with extension into sigmoid sinus. Otherwise workup was unremarkable and syncopal episode likely secondary to vasovagal event. Patient was discharged to home and instructed to follow up outpatient with neurosurgery.     Presenting symptoms have resolved. No additional episodes of syncope, lightheadedness, or vision changes. Denies any headaches at this time.         Objective      Vitals:   /74   Pulse 94   Temp 35.8 °C (96.4 °F)   Ht 1.549 m (5' 1\")   Wt 90.7 kg (200 lb)   BMI 37.79 kg/m²         Physical Exam:  A&Ox3   Fluent speech   EOMI; FC x 4  MORGAN: strength 5/5; no drift   SILT   Face and shoulder shrug symmetrical   Tongue midline   Gait steady         Relevant Results:    MRI brain w/wo 02/26/24: L cerebellar/ tentorium extra-axial homogenously enhancing lesion measuring 2.3 x 2.6 x 2.4cm with extension into transverse sigmoid sinus.           Assessment & Plan      Diagnosis:  Ana was seen today for new patient visit.  Diagnoses and all orders for this visit:  Meningioma (CMS/[facility])          Provider Impression:     Patient is a 74-year-old female presenting for initial evaluation for incidental finding of L cerebellar meningioma measuring 2.3x2.6 x 2.4cm after undergoing workup for syncope. No neurological complaints at this time.     I discussed " the natural history of intracranial meningomas, and discussed that many are found incidentally on intracranial imaging as an incidental finding. Patient is not symptomatic from the meningioma.     Therefore, majority of meningiomas remain stable or slowly increase in size over time. However  will order one short interval scan to be completed at 3 months to ensure stability and that meningioma is not atypical. If that MRI is stable will plan for a follow up MRI brain w/wo in 1 year.     - MRI Evan w/wo ordered to be completed 2024  - Will call patient with results once completed     Plan discussed with patient who was in agreement. All questions answered.       Medical History     Past Medical History:   Diagnosis Date    Abdominal bloating 2023    Abdominal pain 2023    Bilateral impacted cerumen 2023    Cough 2023    COVID-19 virus infection 2023    Malignant neoplasm of body of uterus, unspecified site (CMS/HCC) 2023    Personal history of malignant neoplasm of other parts of uterus 2020    History of malignant neoplasm of other parts of uterus    Shoulder pain, right 2023    Uterine cancer (CMS/HCC) 2012     Past Surgical History:   Procedure Laterality Date    TOTAL ABDOMINAL HYSTERECTOMY W/ BILATERAL SALPINGOOPHORECTOMY  2016    Total Abdominal Hysterectomy With Removal Of Both Ovaries     Social History     Tobacco Use    Smoking status: Former     Current packs/day: 0.00     Types: Cigarettes     Quit date:      Years since quittin.2    Smokeless tobacco: Never   Substance Use Topics    Alcohol use: Yes    Drug use: Never     Family History   Problem Relation Name Age of Onset    No Known Problems Mother      No Known Problems Father       Allergies   Allergen Reactions    Codeine Other    Oxycodone-Acetaminophen Other     Current Outpatient Medications   Medication Instructions    atorvastatin (Lipitor) 20 mg tablet Take 1 tablet daily   APOT TAB    ergocalciferol (Vitamin D-2) 1.25 MG (08643 UT) capsule TAKE 1 CAPSULE Weekly    lansoprazole (PREVACID) 30 mg, oral, Daily before breakfast, MUST MAKE APPT FOR FURTHER REFILLS    lisinopriL-hydrochlorothiazide 20-12.5 mg tablet 1 tablet, oral, Daily    methocarbamol (ROBAXIN) 500 mg, oral, 4 times daily PRN    potassium chloride CR (Klor-Con M20) 20 mEq ER tablet 40 mEq, oral, Daily, Do not crush or chew.

## 2024-04-11 NOTE — PATIENT INSTRUCTIONS
Welcome to Rani Bowers, CNP clinic. We are here to assist you through your Neurological Surgery care at [facility]. My office number is [phone]. This number is the most direct way to communicate with the office. The office fax number is [phone].     A MRI brain was ordered during your visit today. I would like this to be completed in June 2024. You will need to check your creatinine before your MRI. This should be done no more than 3 days prior and can be completed at any  lab.     We will call you and assist you with scheduling your MRI.

## 2024-04-19 ENCOUNTER — OFFICE VISIT (OUTPATIENT)
Dept: PRIMARY CARE | Facility: CLINIC | Age: 74
End: 2024-04-19
Payer: MEDICARE

## 2024-04-19 ENCOUNTER — LAB (OUTPATIENT)
Dept: LAB | Facility: LAB | Age: 74
End: 2024-04-19
Payer: MEDICARE

## 2024-04-19 VITALS
SYSTOLIC BLOOD PRESSURE: 101 MMHG | DIASTOLIC BLOOD PRESSURE: 68 MMHG | HEART RATE: 94 BPM | RESPIRATION RATE: 15 BRPM | HEIGHT: 61 IN | BODY MASS INDEX: 38.71 KG/M2 | OXYGEN SATURATION: 99 % | WEIGHT: 205 LBS

## 2024-04-19 DIAGNOSIS — E78.5 DYSLIPIDEMIA: ICD-10-CM

## 2024-04-19 DIAGNOSIS — D32.9 MENINGIOMA (MULTI): ICD-10-CM

## 2024-04-19 DIAGNOSIS — E66.01 CLASS 2 SEVERE OBESITY WITH SERIOUS COMORBIDITY AND BODY MASS INDEX (BMI) OF 38.0 TO 38.9 IN ADULT, UNSPECIFIED OBESITY TYPE (MULTI): ICD-10-CM

## 2024-04-19 DIAGNOSIS — I10 ESSENTIAL HYPERTENSION: ICD-10-CM

## 2024-04-19 DIAGNOSIS — M54.12 CERVICAL RADICULOPATHY: Primary | ICD-10-CM

## 2024-04-19 LAB
ALBUMIN SERPL BCP-MCNC: 3.9 G/DL (ref 3.4–5)
ALP SERPL-CCNC: 56 U/L (ref 33–136)
ALT SERPL W P-5'-P-CCNC: 16 U/L (ref 7–45)
ANION GAP SERPL CALC-SCNC: 12 MMOL/L (ref 10–20)
AST SERPL W P-5'-P-CCNC: 20 U/L (ref 9–39)
BASOPHILS # BLD AUTO: 0.02 X10*3/UL (ref 0–0.1)
BASOPHILS NFR BLD AUTO: 0.4 %
BILIRUB SERPL-MCNC: 0.8 MG/DL (ref 0–1.2)
BUN SERPL-MCNC: 9 MG/DL (ref 6–23)
CALCIUM SERPL-MCNC: 9 MG/DL (ref 8.6–10.6)
CHLORIDE SERPL-SCNC: 103 MMOL/L (ref 98–107)
CO2 SERPL-SCNC: 30 MMOL/L (ref 21–32)
CREAT SERPL-MCNC: 0.87 MG/DL (ref 0.5–1.05)
EGFRCR SERPLBLD CKD-EPI 2021: 70 ML/MIN/1.73M*2
EOSINOPHIL # BLD AUTO: 0.12 X10*3/UL (ref 0–0.4)
EOSINOPHIL NFR BLD AUTO: 2.4 %
ERYTHROCYTE [DISTWIDTH] IN BLOOD BY AUTOMATED COUNT: 13.2 % (ref 11.5–14.5)
GLUCOSE SERPL-MCNC: 93 MG/DL (ref 74–99)
HCT VFR BLD AUTO: 42.7 % (ref 36–46)
HGB BLD-MCNC: 13.8 G/DL (ref 12–16)
IMM GRANULOCYTES # BLD AUTO: 0.01 X10*3/UL (ref 0–0.5)
IMM GRANULOCYTES NFR BLD AUTO: 0.2 % (ref 0–0.9)
LYMPHOCYTES # BLD AUTO: 1.94 X10*3/UL (ref 0.8–3)
LYMPHOCYTES NFR BLD AUTO: 38.2 %
MCH RBC QN AUTO: 31 PG (ref 26–34)
MCHC RBC AUTO-ENTMCNC: 32.3 G/DL (ref 32–36)
MCV RBC AUTO: 96 FL (ref 80–100)
MONOCYTES # BLD AUTO: 0.38 X10*3/UL (ref 0.05–0.8)
MONOCYTES NFR BLD AUTO: 7.5 %
NEUTROPHILS # BLD AUTO: 2.61 X10*3/UL (ref 1.6–5.5)
NEUTROPHILS NFR BLD AUTO: 51.3 %
NRBC BLD-RTO: 0 /100 WBCS (ref 0–0)
PLATELET # BLD AUTO: 246 X10*3/UL (ref 150–450)
POTASSIUM SERPL-SCNC: 3.8 MMOL/L (ref 3.5–5.3)
PROT SERPL-MCNC: 6.3 G/DL (ref 6.4–8.2)
RBC # BLD AUTO: 4.45 X10*6/UL (ref 4–5.2)
SODIUM SERPL-SCNC: 141 MMOL/L (ref 136–145)
WBC # BLD AUTO: 5.1 X10*3/UL (ref 4.4–11.3)

## 2024-04-19 PROCEDURE — 1160F RVW MEDS BY RX/DR IN RCRD: CPT | Performed by: FAMILY MEDICINE

## 2024-04-19 PROCEDURE — 36415 COLL VENOUS BLD VENIPUNCTURE: CPT

## 2024-04-19 PROCEDURE — 3008F BODY MASS INDEX DOCD: CPT | Performed by: FAMILY MEDICINE

## 2024-04-19 PROCEDURE — 85025 COMPLETE CBC W/AUTO DIFF WBC: CPT

## 2024-04-19 PROCEDURE — 99214 OFFICE O/P EST MOD 30 MIN: CPT | Performed by: FAMILY MEDICINE

## 2024-04-19 PROCEDURE — 3078F DIAST BP <80 MM HG: CPT | Performed by: FAMILY MEDICINE

## 2024-04-19 PROCEDURE — 3074F SYST BP LT 130 MM HG: CPT | Performed by: FAMILY MEDICINE

## 2024-04-19 PROCEDURE — 80053 COMPREHEN METABOLIC PANEL: CPT

## 2024-04-19 PROCEDURE — 1159F MED LIST DOCD IN RCRD: CPT | Performed by: FAMILY MEDICINE

## 2024-04-19 RX ORDER — GABAPENTIN 100 MG/1
100 CAPSULE ORAL 3 TIMES DAILY
Qty: 90 CAPSULE | Refills: 0 | Status: SHIPPED | OUTPATIENT
Start: 2024-04-19 | End: 2024-10-16

## 2024-04-19 ASSESSMENT — ENCOUNTER SYMPTOMS
LOSS OF SENSATION IN FEET: 1
DEPRESSION: 0
OCCASIONAL FEELINGS OF UNSTEADINESS: 0

## 2024-04-19 ASSESSMENT — PATIENT HEALTH QUESTIONNAIRE - PHQ9
1. LITTLE INTEREST OR PLEASURE IN DOING THINGS: NOT AT ALL
SUM OF ALL RESPONSES TO PHQ9 QUESTIONS 1 AND 2: 0
2. FEELING DOWN, DEPRESSED OR HOPELESS: NOT AT ALL

## 2024-04-19 NOTE — PROGRESS NOTES
Subjective   Patient ID: Ana Lira is a 74 y.o. female who presents for Follow-up (With blood work).      HPI patient is here for follow-up was seen by neurosurgery has another MRI scheduled for June states she had adverse reaction to previous MRI contrast had a hard time maintaining her balance was very weak afterwards was nauseous and vomited  Blood pressure is low today medication was decreased the last time has been checking at home is well-controlled  Tolerating statin  States the gabapentin makes her drowsy but helps with the sciatic pain    Current Outpatient Medications on File Prior to Visit   Medication Sig Dispense Refill    atorvastatin (Lipitor) 20 mg tablet Take 1 tablet daily  APOT TAB 90 tablet 3    ergocalciferol (Vitamin D-2) 1.25 MG (11781 UT) capsule TAKE 1 CAPSULE Weekly 12 capsule 1    lansoprazole (Prevacid) 30 mg DR capsule Take 1 capsule (30 mg) by mouth once daily in the morning. Take before meals. MUST MAKE APPT FOR FURTHER REFILLS 90 capsule 0    lisinopriL-hydrochlorothiazide 20-12.5 mg tablet Take 1 tablet by mouth once daily. 90 tablet 1    potassium chloride CR (Klor-Con M20) 20 mEq ER tablet Take 2 tablets (40 mEq) by mouth once daily. Do not crush or chew. 180 tablet 1    methocarbamol (Robaxin) 500 mg tablet Take 1 tablet (500 mg) by mouth 4 times a day as needed for muscle spasms. 40 tablet 0     No current facility-administered medications on file prior to visit.        Review of Systems   Constitutional:  Positive for activity change and fatigue. Negative for chills and fever.   HENT: Negative.     Respiratory: Negative.     Cardiovascular: Negative.    Gastrointestinal: Negative.  Negative for nausea and vomiting.   Endocrine: Negative.    Genitourinary: Negative.    Musculoskeletal:  Positive for arthralgias, back pain, gait problem and myalgias.   Skin: Negative.  Negative for rash.   Allergic/Immunologic: Negative.    Hematological: Negative.    Psychiatric/Behavioral:  "Negative.     All other systems reviewed and are negative.      Objective   /68   Pulse 94   Resp 15   Ht 1.549 m (5' 1\")   Wt 93 kg (205 lb)   SpO2 99%   BMI 38.73 kg/m²   BSA: 2 meters squared  Growth percentiles: Facility age limit for growth %tl is 20 years. Facility age limit for growth %tl is 20 years.   Lab on 04/19/2024   Component Date Value Ref Range Status    Glucose 04/19/2024 93  74 - 99 mg/dL Final    Sodium 04/19/2024 141  136 - 145 mmol/L Final    Potassium 04/19/2024 3.8  3.5 - 5.3 mmol/L Final    Chloride 04/19/2024 103  98 - 107 mmol/L Final    Bicarbonate 04/19/2024 30  21 - 32 mmol/L Final    Anion Gap 04/19/2024 12  10 - 20 mmol/L Final    Urea Nitrogen 04/19/2024 9  6 - 23 mg/dL Final    Creatinine 04/19/2024 0.87  0.50 - 1.05 mg/dL Final    eGFR 04/19/2024 70  >60 mL/min/1.73m*2 Final    Calculations of estimated GFR are performed using the 2021 CKD-EPI Study Refit equation without the race variable for the IDMS-Traceable creatinine methods.  https://jasn.asnjournals.org/content/early/2021/09/22/ASN.0854302027    Calcium 04/19/2024 9.0  8.6 - 10.6 mg/dL Final    Albumin 04/19/2024 3.9  3.4 - 5.0 g/dL Final    Alkaline Phosphatase 04/19/2024 56  33 - 136 U/L Final    Total Protein 04/19/2024 6.3 (L)  6.4 - 8.2 g/dL Final    AST 04/19/2024 20  9 - 39 U/L Final    Bilirubin, Total 04/19/2024 0.8  0.0 - 1.2 mg/dL Final    ALT 04/19/2024 16  7 - 45 U/L Final    Patients treated with Sulfasalazine may generate falsely decreased results for ALT.    WBC 04/19/2024 5.1  4.4 - 11.3 x10*3/uL Final    nRBC 04/19/2024 0.0  0.0 - 0.0 /100 WBCs Final    RBC 04/19/2024 4.45  4.00 - 5.20 x10*6/uL Final    Hemoglobin 04/19/2024 13.8  12.0 - 16.0 g/dL Final    Hematocrit 04/19/2024 42.7  36.0 - 46.0 % Final    MCV 04/19/2024 96  80 - 100 fL Final    MCH 04/19/2024 31.0  26.0 - 34.0 pg Final    MCHC 04/19/2024 32.3  32.0 - 36.0 g/dL Final    RDW 04/19/2024 13.2  11.5 - 14.5 % Final    Platelets " 04/19/2024 246  150 - 450 x10*3/uL Final    Neutrophils % 04/19/2024 51.3  40.0 - 80.0 % Final    Immature Granulocytes %, Automated 04/19/2024 0.2  0.0 - 0.9 % Final    Immature Granulocyte Count (IG) includes promyelocytes, myelocytes and metamyelocytes but does not include bands. Percent differential counts (%) should be interpreted in the context of the absolute cell counts (cells/UL).    Lymphocytes % 04/19/2024 38.2  13.0 - 44.0 % Final    Monocytes % 04/19/2024 7.5  2.0 - 10.0 % Final    Eosinophils % 04/19/2024 2.4  0.0 - 6.0 % Final    Basophils % 04/19/2024 0.4  0.0 - 2.0 % Final    Neutrophils Absolute 04/19/2024 2.61  1.60 - 5.50 x10*3/uL Final    Percent differential counts (%) should be interpreted in the context of the absolute cell counts (cells/uL).    Immature Granulocytes Absolute, Au* 04/19/2024 0.01  0.00 - 0.50 x10*3/uL Final    Lymphocytes Absolute 04/19/2024 1.94  0.80 - 3.00 x10*3/uL Final    Monocytes Absolute 04/19/2024 0.38  0.05 - 0.80 x10*3/uL Final    Eosinophils Absolute 04/19/2024 0.12  0.00 - 0.40 x10*3/uL Final    Basophils Absolute 04/19/2024 0.02  0.00 - 0.10 x10*3/uL Final      Physical Exam  Constitutional:       General: She is not in acute distress.  HENT:      Head: Normocephalic and atraumatic.      Nose: Nose normal.   Eyes:      Extraocular Movements: Extraocular movements intact.   Cardiovascular:      Rate and Rhythm: Normal rate and regular rhythm.   Pulmonary:      Breath sounds: Normal breath sounds.   Abdominal:      General: Bowel sounds are normal.   Musculoskeletal:         General: Normal range of motion.      Cervical back: No rigidity.   Skin:     Findings: No rash.   Neurological:      General: No focal deficit present.      Mental Status: She is alert.   Psychiatric:         Thought Content: Thought content normal.         Assessment/Plan   Problem List Items Addressed This Visit             ICD-10-CM    Cervical radiculopathy - Primary M54.12    Relevant  Medications    gabapentin (Neurontin) 100 mg capsule    Class 2 severe obesity with serious comorbidity in adult (Multi) E66.01    Dyslipidemia E78.5    Relevant Orders    Comprehensive Metabolic Panel (Completed)    CBC and Auto Differential (Completed)    Essential hypertension I10     Will recheck blood pressure is 118/78 patient will monitor closely at home call back if she has low numbers maintain water intake         Meningioma (Multi) D32.9     Saw neurosurgery has follow-up MRI in June which is without contrast

## 2024-04-20 ASSESSMENT — ENCOUNTER SYMPTOMS
CARDIOVASCULAR NEGATIVE: 1
NAUSEA: 0
ENDOCRINE NEGATIVE: 1
BACK PAIN: 1
GASTROINTESTINAL NEGATIVE: 1
HEMATOLOGIC/LYMPHATIC NEGATIVE: 1
ACTIVITY CHANGE: 1
ARTHRALGIAS: 1
CHILLS: 0
RESPIRATORY NEGATIVE: 1
PSYCHIATRIC NEGATIVE: 1
FATIGUE: 1
FEVER: 0
VOMITING: 0
ALLERGIC/IMMUNOLOGIC NEGATIVE: 1
MYALGIAS: 1

## 2024-04-20 NOTE — ASSESSMENT & PLAN NOTE
Physical Therapy Visit    Visit Type: Daily Treatment Note  Visit: 5  Referring Provider: Trena Rizo CNP  Medical Diagnosis (from order): Diagnosis Information    Diagnosis  332.0 (ICD-9-CM) - G20 (ICD-10-CM) - Parkinson's disease (CMD)  781.0 (ICD-9-CM) - R25.1 (ICD-10-CM) - Tremor  728.89 (ICD-9-CM) - R29.898 (ICD-10-CM) - Cogwheel rigidity         SUBJECTIVE                                                                                                               Patient states he was lifting a garbage bag up last night and his back is a little sore today. He reports improvements in his balance since starting therapy and compliance with the HEP. Patient does report difficulty negotiating steps that are shallow and if he has to carry something up/down the stairs.     Pain / Symptoms  - Pain rating (out of 10): Current: 2     Function (patient/family/caregiver reported):   Functional Change: Improved balance      OBJECTIVE                                                                                                                                    Treatment     Therapeutic Exercise  Nustep  7 minutes at workload 6 - denies any pain  Sit to stands with level 2 TB : 12 reps x 1`- no upper extremity support, improved form and posture    HELD  Step ups (6 in) 10 reps x 2- no upper extremity support  Heel raises on Airex : x 20 reps- light upper extremity support  Standing marching on Airex x 20 reps- light upper extremity support      Neuromuscular Re-Education  Tandem walking on uneven terrain- 20 feet x 4- no upper extremity support to rail  Slow forward march on uneven terrain- 20 feet x 2- no  upper extremity support to rail  Carioca on uneven terrain-  20 feet x 2, each direction,no upper extremity support to rail  Retro walking on uneven terrain- 20 feet x 4- no upper extremity support to rail    Side stepping on uneven terrain-  with level 2 TB 20 feet x 2, each direction- no extremity support  Speed  Will recheck blood pressure is 118/78 patient will monitor closely at home call back if she has low numbers maintain water intake   skaters on uneven terrain- with level 2 TB 20 feet x 2, each direction- no extremity support  W stepping on uneven terrain- with level 2 TB 20 feet x 2, each direction- no extremity support    Tandem walking on Airex balance beam x 6 reps- intermittent upper extremity support  Side stepping on [place] balance beam x 6 reps- no upper extremity support     Forward negotiation over 11 hurdles x 4 over uneven terrain with reciprocating gait pattern with focus on increased step height, length, and heel toe sequencint  Side stepping negotiation over 11 hurdles x 4 over uneven terrain, cues for form, no upper extremity support    Obstacle course x 5 reps: negotiation of an 8 inch step, 6 inch step, negotiation over 3 hurdles on uneven terrain, marching x 5 reps on Airex balance pad, ambulation over narrow riser, negotiation over obstacle, figure 8 negotiation around 3 cones on uneven terrain. - increased instability around narrow cones/obstacles- scissoring noted and postural instability- cues required to complete turns slower and increase base of support. - improved with cues and practice    Gait Training  HELD    Patient completed gait training for 10 continuous minutes with addition of intermittent obstacles including step up on Airex, walking across narrow riser, negotiation over 6 hurdles over uenve terrain and figure 8 negotiation around 3 cones on uneven terrain- focus on reinforcement of increased step length and foot clearance, mostly right lower extremity , and increased right arm swing, occasional cues for upright cervical posture required. Patient also completed with intermittent change in gait speed, horizontal and vertical head turns, and retro ambulation. During conversation and multi tasking patient requires increased cues for arm swing and shuffle steps.     Skilled input: verbal instruction/cues, tactile instruction/cues, facilitation and as detailed above    Writer verbally educated and received verbal  consent for hand placement, positioning of patient, and techniques to be performed today from patient for therapist position for techniques as described above and how they are pertinent to the patient's plan of care.  Home Exercise Program  Access Code: [email or web address]  URL: [email or web address]  Date: 07/20/2023  Prepared by: Marti De Souza    Exercises  - Sit to Stand Without Arm Support  - 1 x daily - 7 x weekly - 3 sets - 10 reps  - Romberg Stance with Eyes Closed  - 1 x daily - 7 x weekly - 1 sets - 2 reps - 30 sec hold  - Standing Tandem Balance with Counter Support  - 1 x daily - 7 x weekly - 1 sets - 2 reps - 30 ec hold  - Tandem Walking with Counter Support  - 1 x daily - 7 x weekly - 3 sets - 10 reps  - Side Stepping with Resistance at Ankles and Counter Support  - 1 x daily - 7 x weekly - 3 sets - 10 reps  -Gait with focus on increased right arm swing and right step length and foot clearance      ASSESSMENT                                                                                                            Patient able to progress dynamic balance and gait activities to uneven terrain with improved balance and postural stability noted with no upper extremity support required this date. Patient does however demonstrate postural instability and scissoring when completing gait and turns through small spaces. With demonstration, cues and practice, patient able to complete turns at a slower pace with improved safety and increased base of support. Patient will benefit from further review and gait training in small spaces, during turns, and obstacle negotiation and stair negotiation.   Pain/symptoms after session (out of 10): 2  Education:   - Results of above outlined education: Verbalizes understanding and Demonstrates understanding    PLAN                                                                                                                           Suggestions for next session  as indicated: Progress per plan of care:  Stairs- item transport, no rail and shallow steps, gait through small spaces        Therapy procedure time and total treatment time can be found documented on the Time Entry flowsheet

## 2024-05-02 ENCOUNTER — OFFICE VISIT (OUTPATIENT)
Dept: SURGERY | Facility: CLINIC | Age: 74
End: 2024-05-02
Payer: MEDICARE

## 2024-05-02 VITALS
WEIGHT: 202 LBS | BODY MASS INDEX: 38.14 KG/M2 | SYSTOLIC BLOOD PRESSURE: 132 MMHG | HEART RATE: 74 BPM | HEIGHT: 61 IN | DIASTOLIC BLOOD PRESSURE: 77 MMHG

## 2024-05-02 DIAGNOSIS — E04.1 THYROID NODULE: ICD-10-CM

## 2024-05-02 PROCEDURE — 3078F DIAST BP <80 MM HG: CPT | Performed by: SURGERY

## 2024-05-02 PROCEDURE — 1036F TOBACCO NON-USER: CPT | Performed by: SURGERY

## 2024-05-02 PROCEDURE — 99214 OFFICE O/P EST MOD 30 MIN: CPT | Performed by: SURGERY

## 2024-05-02 PROCEDURE — 1159F MED LIST DOCD IN RCRD: CPT | Performed by: SURGERY

## 2024-05-02 PROCEDURE — 99204 OFFICE O/P NEW MOD 45 MIN: CPT | Performed by: SURGERY

## 2024-05-02 PROCEDURE — 3075F SYST BP GE 130 - 139MM HG: CPT | Performed by: SURGERY

## 2024-05-02 PROCEDURE — 1160F RVW MEDS BY RX/DR IN RCRD: CPT | Performed by: SURGERY

## 2024-05-02 PROCEDURE — 3008F BODY MASS INDEX DOCD: CPT | Performed by: SURGERY

## 2024-05-02 ASSESSMENT — ENCOUNTER SYMPTOMS
PSYCHIATRIC NEGATIVE: 1
ENDOCRINE NEGATIVE: 1
NEUROLOGICAL NEGATIVE: 1
EYES NEGATIVE: 1
RESPIRATORY NEGATIVE: 1
CONSTITUTIONAL NEGATIVE: 1
CARDIOVASCULAR NEGATIVE: 1

## 2024-05-02 NOTE — PROGRESS NOTES
Subjective   Patient ID: Ana Lira is a 74 y.o. female who presents for surgical consultation for incidentally discovered nodular thyroid disease.    HPI I saw Mrs. Lira in surgery clinic today.  She was referred by Dr. Espinosa, her primary care physician.  Patient had a CT of the cervical spine done in February of this year showing an enlarged thyroid gland with concern for nodules.  Therefore a follow-up thyroid ultrasound was done March 2024.  Ultrasound revealed a 1.7 cm TI-RADS 4 nodule in the left thyroid lobe which radiology recommended biopsy of.  I personally reviewed the imaging and would agree with the diagnosis and TI-RADS classification.    Patient is clinically and biochemically euthyroid.  TSH in October 2023 was normal at 2.03.    She denies any prior history of thyroid disease.  No neck pain.  No difficulty breathing or swallowing no troubles with her voice.  No personal history of head neck or chest radiation exposure.    Family history no thyroid disorders no thyroid cancer    Review of Systems   Constitutional: Negative.    HENT: Negative.     Eyes: Negative.    Respiratory: Negative.     Cardiovascular: Negative.    Endocrine: Negative.    Neurological: Negative.    Psychiatric/Behavioral: Negative.         Objective   Physical Exam  Vitals reviewed.   Constitutional:       Appearance: Normal appearance.   Eyes:      Comments: No proptosis   Neck:      Vascular: No carotid bruit.      Comments: Patient's thyroid gland feels normal.  I am not able to palpate the left-sided nodule.  Trachea midline.  Cardiovascular:      Rate and Rhythm: Normal rate and regular rhythm.      Pulses: Normal pulses.      Heart sounds: Normal heart sounds.   Pulmonary:      Effort: Pulmonary effort is normal. No respiratory distress.      Breath sounds: Normal breath sounds. No wheezing or rales.   Musculoskeletal:         General: Normal range of motion.   Lymphadenopathy:      Cervical: No cervical adenopathy.    Skin:     General: Skin is warm and dry.   Neurological:      General: No focal deficit present.      Mental Status: She is alert and oriented to person, place, and time.   Psychiatric:         Mood and Affect: Mood normal.         Behavior: Behavior normal.         US THYROID;  3/14/2024 11:28 am      INDICATION:  Signs/Symptoms:see dx.      COMPARISON:  None.      ACCESSION NUMBER(S):  [id number]      ORDERING CLINICIAN:  RELL RIVERA      TECHNIQUE:  Grayscale imaging, color Doppler, and spectral Doppler were utilized.      FINDINGS:  Right thyroid lobe measured 2.2 cm x 2.0 cm x 5.6 cm cm .   Right  thyroid lobe echogenicity was homogeneous throughout, without solid  or cystic mass.      Thyroid isthmus measured  0.4 cm in AP thickness.      Left thyroid lobe measured 2.3 cm x 1.7 cm x 5.4 cm cm.  Homogeneous echogenicity throughout. Posterior solid lower pole  nodule is described below. There is also an insignificant incidental  3 mm complex cystic nodule in the midpole.          NODULES: (Please note, assessment and description of nodules is per  TI-RADS criteria. Up to 4 total nodules described, which includes  largest and/or most clinically significant based on morphology.) It  is noted that some spongiform and/or cystic nodules may not be  specifically described and are TR category 1 (benign).      NODULE #: 1.      Location: Posterior lower pole on the left  Size: 17 x 8 x 12 mm  Composition: Solid or almost completely solid (2)  Echogenicity: Hypoechoic (2)  Shape: Wider-than-tall (0)  Margin: Smooth (0)  Echogenic Foci: None or Large comet-tail artifacts (0)      The total score of this nodule is 4 points, corresponding to a  TI-RADS category 4; (4-6 points) Moderately suspicious.      IMPRESSION:  Mild thyromegaly.      Posterior lower pole left-sided TR 4 nodule for which percutaneous  needle biopsy under ultrasound guidance is recommended.    Assessment/Plan    Mrs. Lira had incidental discovery  of a 1.7 cm left-sided TI-RADS 4 nodule after cervical spine CT scan and subsequent ultrasound.  She has no compressive symptoms with this she is clinically and biochemically euthyroid.  There is no family history of malignancy for thyroid cancer no personal exposure risk for thyroid cancer.    I told her that based on her TI-RADS classification the nodule size I would recommend a biopsy of the lesion.  She said that she has been through a lot of tests in the last few months and is not interested in undergoing a biopsy right now.  She is willing to undergo follow-up ultrasound.  Therefore I told her I will at least order a 6-month ultrasound.  She did agree that if her nodule gets bigger in the future she would be more amenable to getting a biopsy done.    She does understand that I cannot currently tell her what her nodule is other than that it has features that would warrant biopsy.  She understands this and is comfortable starting with a 6-month ultrasound.  My nurse gave her a requisition for this.         Destin Pollack MD 05/02/24 1:48 PM

## 2024-05-02 NOTE — LETTER
May 2, 2024     Taurus Espinosa MD  [address]  [facility] [address]  [facility] [address]    Patient: Ana Lira   YOB: 1950   Date of Visit: 5/2/2024       Dear Dr. Taurus Espinosa MD:    Thank you for referring Ana Lira to me for evaluation. Below are my notes for this consultation.  If you have questions, please do not hesitate to call me. I look forward to following your patient along with you.       Sincerely,     Destin Pollack MD      CC: No Recipients  ______________________________________________________________________________________    Subjective  Patient ID: Ana Lira is a 74 y.o. female who presents for surgical consultation for incidentally discovered nodular thyroid disease.    HPI I saw Mrs. Lira in surgery clinic today.  She was referred by Dr. Espinosa, her primary care physician.  Patient had a CT of the cervical spine done in February of this year showing an enlarged thyroid gland with concern for nodules.  Therefore a follow-up thyroid ultrasound was done March 2024.  Ultrasound revealed a 1.7 cm TI-RADS 4 nodule in the left thyroid lobe which radiology recommended biopsy of.  I personally reviewed the imaging and would agree with the diagnosis and TI-RADS classification.    Patient is clinically and biochemically euthyroid.  TSH in October 2023 was normal at 2.03.    She denies any prior history of thyroid disease.  No neck pain.  No difficulty breathing or swallowing no troubles with her voice.  No personal history of head neck or chest radiation exposure.    Family history no thyroid disorders no thyroid cancer    Review of Systems   Constitutional: Negative.    HENT: Negative.     Eyes: Negative.    Respiratory: Negative.     Cardiovascular: Negative.    Endocrine: Negative.    Neurological: Negative.    Psychiatric/Behavioral: Negative.         Objective  Physical Exam  Vitals reviewed.   Constitutional:       Appearance: Normal appearance.   Eyes:      Comments: No proptosis    Neck:      Vascular: No carotid bruit.      Comments: Patient's thyroid gland feels normal.  I am not able to palpate the left-sided nodule.  Trachea midline.  Cardiovascular:      Rate and Rhythm: Normal rate and regular rhythm.      Pulses: Normal pulses.      Heart sounds: Normal heart sounds.   Pulmonary:      Effort: Pulmonary effort is normal. No respiratory distress.      Breath sounds: Normal breath sounds. No wheezing or rales.   Musculoskeletal:         General: Normal range of motion.   Lymphadenopathy:      Cervical: No cervical adenopathy.   Skin:     General: Skin is warm and dry.   Neurological:      General: No focal deficit present.      Mental Status: She is alert and oriented to person, place, and time.   Psychiatric:         Mood and Affect: Mood normal.         Behavior: Behavior normal.         US THYROID;  3/14/2024 11:28 am      INDICATION:  Signs/Symptoms:see dx.      COMPARISON:  None.      ACCESSION NUMBER(S):  [id number]      ORDERING CLINICIAN:  RELL RIVERA      TECHNIQUE:  Grayscale imaging, color Doppler, and spectral Doppler were utilized.      FINDINGS:  Right thyroid lobe measured 2.2 cm x 2.0 cm x 5.6 cm cm .   Right  thyroid lobe echogenicity was homogeneous throughout, without solid  or cystic mass.      Thyroid isthmus measured  0.4 cm in AP thickness.      Left thyroid lobe measured 2.3 cm x 1.7 cm x 5.4 cm cm.  Homogeneous echogenicity throughout. Posterior solid lower pole  nodule is described below. There is also an insignificant incidental  3 mm complex cystic nodule in the midpole.          NODULES: (Please note, assessment and description of nodules is per  TI-RADS criteria. Up to 4 total nodules described, which includes  largest and/or most clinically significant based on morphology.) It  is noted that some spongiform and/or cystic nodules may not be  specifically described and are TR category 1 (benign).      NODULE #: 1.      Location: Posterior lower pole on the  left  Size: 17 x 8 x 12 mm  Composition: Solid or almost completely solid (2)  Echogenicity: Hypoechoic (2)  Shape: Wider-than-tall (0)  Margin: Smooth (0)  Echogenic Foci: None or Large comet-tail artifacts (0)      The total score of this nodule is 4 points, corresponding to a  TI-RADS category 4; (4-6 points) Moderately suspicious.      IMPRESSION:  Mild thyromegaly.      Posterior lower pole left-sided TR 4 nodule for which percutaneous  needle biopsy under ultrasound guidance is recommended.    Assessment/Plan   Mrs. Lira had incidental discovery of a 1.7 cm left-sided TI-RADS 4 nodule after cervical spine CT scan and subsequent ultrasound.  She has no compressive symptoms with this she is clinically and biochemically euthyroid.  There is no family history of malignancy for thyroid cancer no personal exposure risk for thyroid cancer.    I told her that based on her TI-RADS classification the nodule size I would recommend a biopsy of the lesion.  She said that she has been through a lot of tests in the last few months and is not interested in undergoing a biopsy right now.  She is willing to undergo follow-up ultrasound.  Therefore I told her I will at least order a 6-month ultrasound.  She did agree that if her nodule gets bigger in the future she would be more amenable to getting a biopsy done.    She does understand that I cannot currently tell her what her nodule is other than that it has features that would warrant biopsy.  She understands this and is comfortable starting with a 6-month ultrasound.  My nurse gave her a requisition for this.         Destin Pollack MD 05/02/24 1:48 PM

## 2024-05-09 ENCOUNTER — OFFICE VISIT (OUTPATIENT)
Dept: GASTROENTEROLOGY | Facility: CLINIC | Age: 74
End: 2024-05-09
Payer: MEDICARE

## 2024-05-09 VITALS — HEIGHT: 61 IN | OXYGEN SATURATION: 98 % | WEIGHT: 202 LBS | HEART RATE: 80 BPM | BODY MASS INDEX: 38.14 KG/M2

## 2024-05-09 DIAGNOSIS — K25.9 GASTRIC ULCER, UNSPECIFIED CHRONICITY, UNSPECIFIED WHETHER GASTRIC ULCER HEMORRHAGE OR PERFORATION PRESENT: ICD-10-CM

## 2024-05-09 DIAGNOSIS — R10.13 EPIGASTRIC PAIN: Primary | ICD-10-CM

## 2024-05-09 PROCEDURE — 1159F MED LIST DOCD IN RCRD: CPT | Performed by: INTERNAL MEDICINE

## 2024-05-09 PROCEDURE — 1160F RVW MEDS BY RX/DR IN RCRD: CPT | Performed by: INTERNAL MEDICINE

## 2024-05-09 PROCEDURE — 3008F BODY MASS INDEX DOCD: CPT | Performed by: INTERNAL MEDICINE

## 2024-05-09 PROCEDURE — 1036F TOBACCO NON-USER: CPT | Performed by: INTERNAL MEDICINE

## 2024-05-09 PROCEDURE — 99214 OFFICE O/P EST MOD 30 MIN: CPT | Performed by: INTERNAL MEDICINE

## 2024-05-09 RX ORDER — LANSOPRAZOLE 30 MG/1
30 CAPSULE, DELAYED RELEASE ORAL
Qty: 90 CAPSULE | Refills: 3 | Status: SHIPPED | OUTPATIENT
Start: 2024-05-09

## 2024-05-09 NOTE — PATIENT INSTRUCTIONS
Recommend;    Try reducing lansoprazole to every other day    If still feel okay could go to just taking on an as needed basis (if abdominal discomfort)    If symptoms get worse, go back to daily    Let me know if any problems.

## 2024-05-09 NOTE — PROGRESS NOTES
Subjective     History of Present Illness:   Ana Lira is a 74 y.o. female who presents to GI clinic for occasional epigastric pain. Worse after spicy foods..    Review of Systems  Review of Systems    Social History   reports that she quit smoking about 21 years ago. Her smoking use included cigarettes. She has never been exposed to tobacco smoke. She has never used smokeless tobacco. She reports current alcohol use. She reports that she does not use drugs.     Allergies  Allergies   Allergen Reactions    Codeine Other    Oxycodone-Acetaminophen Other       Medications  Current Outpatient Medications   Medication Instructions    atorvastatin (Lipitor) 20 mg tablet Take 1 tablet daily  APOT TAB    ergocalciferol (Vitamin D-2) 1.25 MG (15423 UT) capsule TAKE 1 CAPSULE Weekly    gabapentin (NEURONTIN) 100 mg, oral, 3 times daily    lansoprazole (PREVACID) 30 mg, oral, Daily before breakfast, MUST MAKE APPT FOR FURTHER REFILLS    lisinopriL-hydrochlorothiazide 20-12.5 mg tablet 1 tablet, oral, Daily    methocarbamol (ROBAXIN) 500 mg, oral, 4 times daily PRN    potassium chloride CR (Klor-Con M20) 20 mEq ER tablet 40 mEq, oral, Daily, Do not crush or chew.         Objective   Visit Vitals  Pulse 80      Physical Exam  Constitutional:       Appearance: Normal appearance.   HENT:      Mouth/Throat:      Mouth: Mucous membranes are moist.      Pharynx: Oropharynx is clear.   Eyes:      Extraocular Movements: Extraocular movements intact.      Pupils: Pupils are equal, round, and reactive to light.   Cardiovascular:      Rate and Rhythm: Normal rate and regular rhythm.      Heart sounds: No murmur heard.  Pulmonary:      Effort: Pulmonary effort is normal.      Breath sounds: Normal breath sounds.   Abdominal:      General: Abdomen is flat. Bowel sounds are normal.      Palpations: Abdomen is soft. There is no mass.   Skin:     General: Skin is warm and dry.   Neurological:      Mental Status: She is alert.    Psychiatric:         Mood and Affect: Mood normal.         Behavior: Behavior normal.         Thought Content: Thought content normal.         Judgment: Judgment normal.                 Assessment/Plan   Ana Lira is a 74 y.o. female who presents to GI clinic for off and on epigastric pain well controlled with the lansoprazole. EGD 2022 normal. No heartburn, dysphagia, change BM, blood in stool.    Recommend;    Try reducing lansoprazole to every other day    If still feel okay could go to just taking on an as needed basis (if abdominal discomfort)    If symptoms get worse, go back to daily    Let me know if any problems.      Carlos Brasher MD

## 2024-05-15 ENCOUNTER — HOSPITAL ENCOUNTER (OUTPATIENT)
Dept: RADIOLOGY | Facility: CLINIC | Age: 74
Discharge: HOME | End: 2024-05-15
Payer: MEDICARE

## 2024-05-15 DIAGNOSIS — M25.562 ACUTE PAIN OF LEFT KNEE: ICD-10-CM

## 2024-05-15 DIAGNOSIS — M25.561 ACUTE PAIN OF RIGHT KNEE: ICD-10-CM

## 2024-05-15 DIAGNOSIS — M54.9 BACK PAIN DUE TO INJURY: ICD-10-CM

## 2024-05-15 DIAGNOSIS — M25.572 ACUTE LEFT ANKLE PAIN: ICD-10-CM

## 2024-05-15 PROCEDURE — 72100 X-RAY EXAM L-S SPINE 2/3 VWS: CPT | Performed by: RADIOLOGY

## 2024-05-15 PROCEDURE — 73564 X-RAY EXAM KNEE 4 OR MORE: CPT | Mod: LT

## 2024-05-15 PROCEDURE — 72100 X-RAY EXAM L-S SPINE 2/3 VWS: CPT

## 2024-05-15 PROCEDURE — 73610 X-RAY EXAM OF ANKLE: CPT | Mod: LT

## 2024-05-15 PROCEDURE — 73564 X-RAY EXAM KNEE 4 OR MORE: CPT | Mod: RT

## 2024-06-06 ENCOUNTER — HOSPITAL ENCOUNTER (OUTPATIENT)
Dept: RADIOLOGY | Facility: HOSPITAL | Age: 74
Discharge: HOME | End: 2024-06-06
Payer: MEDICARE

## 2024-06-06 DIAGNOSIS — R07.2 PRECORDIAL PAIN: ICD-10-CM

## 2024-06-06 PROCEDURE — 75571 CT HRT W/O DYE W/CA TEST: CPT

## 2024-06-11 DIAGNOSIS — I10 PRIMARY HYPERTENSION: ICD-10-CM

## 2024-06-11 DIAGNOSIS — E87.6 POTASSIUM DEFICIENCY: ICD-10-CM

## 2024-06-11 RX ORDER — LISINOPRIL AND HYDROCHLOROTHIAZIDE 12.5; 2 MG/1; MG/1
1 TABLET ORAL DAILY
Qty: 90 TABLET | Refills: 1 | Status: SHIPPED | OUTPATIENT
Start: 2024-06-11 | End: 2025-06-11

## 2024-06-11 RX ORDER — POTASSIUM CHLORIDE 20 MEQ/1
40 TABLET, EXTENDED RELEASE ORAL DAILY
Qty: 180 TABLET | Refills: 1 | Status: SHIPPED | OUTPATIENT
Start: 2024-06-11

## 2024-06-18 ENCOUNTER — APPOINTMENT (OUTPATIENT)
Dept: PRIMARY CARE | Facility: CLINIC | Age: 74
End: 2024-06-18
Payer: MEDICARE

## 2024-06-18 VITALS
WEIGHT: 208.8 LBS | OXYGEN SATURATION: 97 % | HEART RATE: 80 BPM | HEIGHT: 61 IN | BODY MASS INDEX: 39.42 KG/M2 | RESPIRATION RATE: 20 BRPM | DIASTOLIC BLOOD PRESSURE: 73 MMHG | SYSTOLIC BLOOD PRESSURE: 113 MMHG

## 2024-06-18 DIAGNOSIS — E78.01 FAMILIAL HYPERCHOLESTEROLEMIA: ICD-10-CM

## 2024-06-18 DIAGNOSIS — I10 ESSENTIAL HYPERTENSION: ICD-10-CM

## 2024-06-18 DIAGNOSIS — E78.5 DYSLIPIDEMIA: Primary | ICD-10-CM

## 2024-06-18 DIAGNOSIS — D32.9 MENINGIOMA (MULTI): ICD-10-CM

## 2024-06-18 DIAGNOSIS — Z12.31 VISIT FOR SCREENING MAMMOGRAM: ICD-10-CM

## 2024-06-18 DIAGNOSIS — Z11.59 NEED FOR HEPATITIS C SCREENING TEST: ICD-10-CM

## 2024-06-18 DIAGNOSIS — M17.0 PRIMARY OSTEOARTHRITIS OF BOTH KNEES: ICD-10-CM

## 2024-06-18 DIAGNOSIS — K76.89 LIVER CYST: ICD-10-CM

## 2024-06-18 PROCEDURE — 1159F MED LIST DOCD IN RCRD: CPT | Performed by: FAMILY MEDICINE

## 2024-06-18 PROCEDURE — 36415 COLL VENOUS BLD VENIPUNCTURE: CPT

## 2024-06-18 PROCEDURE — 3078F DIAST BP <80 MM HG: CPT | Performed by: FAMILY MEDICINE

## 2024-06-18 PROCEDURE — 3074F SYST BP LT 130 MM HG: CPT | Performed by: FAMILY MEDICINE

## 2024-06-18 PROCEDURE — 80053 COMPREHEN METABOLIC PANEL: CPT

## 2024-06-18 PROCEDURE — 99214 OFFICE O/P EST MOD 30 MIN: CPT | Performed by: FAMILY MEDICINE

## 2024-06-18 PROCEDURE — G2211 COMPLEX E/M VISIT ADD ON: HCPCS | Performed by: FAMILY MEDICINE

## 2024-06-18 PROCEDURE — 80061 LIPID PANEL: CPT

## 2024-06-18 PROCEDURE — 86803 HEPATITIS C AB TEST: CPT

## 2024-06-18 PROCEDURE — 3008F BODY MASS INDEX DOCD: CPT | Performed by: FAMILY MEDICINE

## 2024-06-18 NOTE — PROGRESS NOTES
Subjective   Patient ID: Ana Lira is a 74 y.o. female who presents for Follow-up (Follow up needs blood work).      HPI patient needs an order for an ultrasound of the liver had coronary calcium score done which showed cysts in the liver denies chest pain shortness of breath saw neurosurgery has follow-up MRI scheduled blood pressure is controlled  Fell again while picking up pizza from a store then the ear had x-rays done which showed advanced degenerative changes Oprah please  Current Outpatient Medications on File Prior to Visit   Medication Sig Dispense Refill    atorvastatin (Lipitor) 20 mg tablet Take 1 tablet daily  APOT TAB 90 tablet 3    ergocalciferol (Vitamin D-2) 1.25 MG (86112 UT) capsule TAKE 1 CAPSULE Weekly 12 capsule 1    gabapentin (Neurontin) 100 mg capsule Take 1 capsule (100 mg) by mouth 3 times a day. 90 capsule 0    lansoprazole (Prevacid) 30 mg DR capsule Take 1 capsule (30 mg) by mouth once daily in the morning. Take before meals. 90 capsule 3    lisinopriL-hydrochlorothiazide 20-12.5 mg tablet Take 1 tablet by mouth once daily. 90 tablet 1    potassium chloride CR 20 mEq ER tablet Take 2 tablets (40 mEq) by mouth once daily. Do not crush or chew. 180 tablet 1    methocarbamol (Robaxin) 500 mg tablet Take 1 tablet (500 mg) by mouth 4 times a day as needed for muscle spasms. (Patient not taking: Reported on 6/18/2024) 40 tablet 0     No current facility-administered medications on file prior to visit.        Review of Systems   Constitutional:  Negative for chills and fever.   HENT: Negative.     Respiratory: Negative.     Cardiovascular: Negative.    Gastrointestinal: Negative.  Negative for nausea and vomiting.   Endocrine: Negative.    Genitourinary: Negative.    Musculoskeletal: Negative.    Skin: Negative.  Negative for rash.   Allergic/Immunologic: Negative.    Neurological: Negative.    Hematological: Negative.    Psychiatric/Behavioral: Negative.     All other systems reviewed  "and are negative.      Objective   /73   Pulse 80   Resp 20   Ht 1.549 m (5' 1\")   Wt 94.7 kg (208 lb 12.8 oz)   SpO2 97%   BMI 39.45 kg/m²   BSA: 2.02 meters squared  Growth percentiles: Facility age limit for growth %tl is 20 years. Facility age limit for growth %tl is 20 years.   Office Visit on 06/18/2024   Component Date Value Ref Range Status    Glucose 06/18/2024 83  74 - 99 mg/dL Final    Sodium 06/18/2024 143  136 - 145 mmol/L Final    Potassium 06/18/2024 3.8  3.5 - 5.3 mmol/L Final    Chloride 06/18/2024 107  98 - 107 mmol/L Final    Bicarbonate 06/18/2024 26  21 - 32 mmol/L Final    Anion Gap 06/18/2024 14  10 - 20 mmol/L Final    Urea Nitrogen 06/18/2024 11  6 - 23 mg/dL Final    Creatinine 06/18/2024 0.91  0.50 - 1.05 mg/dL Final    eGFR 06/18/2024 67  >60 mL/min/1.73m*2 Final    Calculations of estimated GFR are performed using the 2021 CKD-EPI Study Refit equation without the race variable for the IDMS-Traceable creatinine methods.  https://jasn.asnjournals.org/content/early/2021/09/22/ASN.0480514425    Calcium 06/18/2024 9.0  8.6 - 10.6 mg/dL Final    Albumin 06/18/2024 3.7  3.4 - 5.0 g/dL Final    Alkaline Phosphatase 06/18/2024 59  33 - 136 U/L Final    Total Protein 06/18/2024 6.2 (L)  6.4 - 8.2 g/dL Final    AST 06/18/2024 22  9 - 39 U/L Final    Bilirubin, Total 06/18/2024 0.7  0.0 - 1.2 mg/dL Final    ALT 06/18/2024 14  7 - 45 U/L Final    Patients treated with Sulfasalazine may generate falsely decreased results for ALT.      Physical Exam  Constitutional:       General: She is not in acute distress.  Eyes:      Extraocular Movements: Extraocular movements intact.   Cardiovascular:      Rate and Rhythm: Normal rate and regular rhythm.   Pulmonary:      Breath sounds: Normal breath sounds.   Abdominal:      General: Bowel sounds are normal.   Musculoskeletal:         General: Tenderness present.      Cervical back: No rigidity.   Skin:     Findings: No rash.   Neurological:     "  General: No focal deficit present.      Mental Status: She is alert.   Psychiatric:         Thought Content: Thought content normal.         Assessment/Plan   Problem List Items Addressed This Visit             ICD-10-CM    Dyslipidemia - Primary E78.5    Relevant Orders    Comprehensive Metabolic Panel (Completed)    Lipid Panel    Hepatitis C Antibody    Essential hypertension I10    Relevant Orders    Comprehensive Metabolic Panel (Completed)    Meningioma (Multi) D32.9     MRI as scheduled.         Liver cyst K76.89    Relevant Orders    US abdomen limited liver    Visit for screening mammogram Z12.31    Relevant Orders    BI mammo bilateral screening tomosynthesis    Need for hepatitis C screening test Z11.59    Relevant Orders    Hepatitis C Antibody    Hepatitis C Antibody    Primary osteoarthritis of both knees M17.0     Has therapy scheduled          Other Visit Diagnoses         Codes    Familial hypercholesterolemia     E78.01    Relevant Orders    Comprehensive Metabolic Panel (Completed)

## 2024-06-19 PROBLEM — Z12.31 VISIT FOR SCREENING MAMMOGRAM: Status: ACTIVE | Noted: 2024-06-19

## 2024-06-19 PROBLEM — Z11.59 NEED FOR HEPATITIS C SCREENING TEST: Status: ACTIVE | Noted: 2024-06-19

## 2024-06-19 PROBLEM — K76.89 LIVER CYST: Status: ACTIVE | Noted: 2024-06-19

## 2024-06-19 PROBLEM — M17.0 PRIMARY OSTEOARTHRITIS OF BOTH KNEES: Status: ACTIVE | Noted: 2024-06-19

## 2024-06-19 LAB
ALBUMIN SERPL BCP-MCNC: 3.7 G/DL (ref 3.4–5)
ALP SERPL-CCNC: 59 U/L (ref 33–136)
ALT SERPL W P-5'-P-CCNC: 14 U/L (ref 7–45)
ANION GAP SERPL CALC-SCNC: 14 MMOL/L (ref 10–20)
AST SERPL W P-5'-P-CCNC: 22 U/L (ref 9–39)
BILIRUB SERPL-MCNC: 0.7 MG/DL (ref 0–1.2)
BUN SERPL-MCNC: 11 MG/DL (ref 6–23)
CALCIUM SERPL-MCNC: 9 MG/DL (ref 8.6–10.6)
CHLORIDE SERPL-SCNC: 107 MMOL/L (ref 98–107)
CHOLEST SERPL-MCNC: 190 MG/DL (ref 0–199)
CHOLESTEROL/HDL RATIO: 2.4
CO2 SERPL-SCNC: 26 MMOL/L (ref 21–32)
CREAT SERPL-MCNC: 0.91 MG/DL (ref 0.5–1.05)
EGFRCR SERPLBLD CKD-EPI 2021: 67 ML/MIN/1.73M*2
GLUCOSE SERPL-MCNC: 83 MG/DL (ref 74–99)
HCV AB SER QL: NONREACTIVE
HDLC SERPL-MCNC: 80.1 MG/DL
LDLC SERPL CALC-MCNC: 95 MG/DL
NON HDL CHOLESTEROL: 110 MG/DL (ref 0–149)
POTASSIUM SERPL-SCNC: 3.8 MMOL/L (ref 3.5–5.3)
PROT SERPL-MCNC: 6.2 G/DL (ref 6.4–8.2)
SODIUM SERPL-SCNC: 143 MMOL/L (ref 136–145)
TRIGL SERPL-MCNC: 77 MG/DL (ref 0–149)
VLDL: 15 MG/DL (ref 0–40)

## 2024-06-19 ASSESSMENT — ENCOUNTER SYMPTOMS
RESPIRATORY NEGATIVE: 1
NAUSEA: 0
MUSCULOSKELETAL NEGATIVE: 1
PSYCHIATRIC NEGATIVE: 1
VOMITING: 0
CHILLS: 0
ENDOCRINE NEGATIVE: 1
FEVER: 0
ALLERGIC/IMMUNOLOGIC NEGATIVE: 1
HEMATOLOGIC/LYMPHATIC NEGATIVE: 1
GASTROINTESTINAL NEGATIVE: 1
CARDIOVASCULAR NEGATIVE: 1
NEUROLOGICAL NEGATIVE: 1

## 2024-06-20 ENCOUNTER — APPOINTMENT (OUTPATIENT)
Dept: GYNECOLOGIC ONCOLOGY | Facility: CLINIC | Age: 74
End: 2024-06-20
Payer: MEDICARE

## 2024-06-30 ENCOUNTER — APPOINTMENT (OUTPATIENT)
Dept: CARDIOLOGY | Facility: HOSPITAL | Age: 74
End: 2024-06-30
Payer: MEDICARE

## 2024-06-30 ENCOUNTER — APPOINTMENT (OUTPATIENT)
Dept: RADIOLOGY | Facility: HOSPITAL | Age: 74
End: 2024-06-30
Payer: MEDICARE

## 2024-06-30 ENCOUNTER — HOSPITAL ENCOUNTER (EMERGENCY)
Facility: HOSPITAL | Age: 74
Discharge: HOME | End: 2024-06-30
Attending: EMERGENCY MEDICINE
Payer: MEDICARE

## 2024-06-30 VITALS
HEART RATE: 81 BPM | TEMPERATURE: 98.4 F | OXYGEN SATURATION: 100 % | DIASTOLIC BLOOD PRESSURE: 94 MMHG | SYSTOLIC BLOOD PRESSURE: 138 MMHG | BODY MASS INDEX: 37.76 KG/M2 | WEIGHT: 200 LBS | RESPIRATION RATE: 18 BRPM | HEIGHT: 61 IN

## 2024-06-30 DIAGNOSIS — R10.9 ABDOMINAL PAIN, UNSPECIFIED ABDOMINAL LOCATION: Primary | ICD-10-CM

## 2024-06-30 LAB
ALBUMIN SERPL BCP-MCNC: 3.5 G/DL (ref 3.4–5)
ALP SERPL-CCNC: 50 U/L (ref 33–136)
ALT SERPL W P-5'-P-CCNC: 13 U/L (ref 7–45)
ANION GAP SERPL CALC-SCNC: 13 MMOL/L (ref 10–20)
APPEARANCE UR: CLEAR
AST SERPL W P-5'-P-CCNC: 24 U/L (ref 9–39)
BASOPHILS # BLD AUTO: 0.02 X10*3/UL (ref 0–0.1)
BASOPHILS NFR BLD AUTO: 0.4 %
BILIRUB SERPL-MCNC: 1 MG/DL (ref 0–1.2)
BILIRUB UR STRIP.AUTO-MCNC: NEGATIVE MG/DL
BUN SERPL-MCNC: 6 MG/DL (ref 6–23)
CALCIUM SERPL-MCNC: 8.9 MG/DL (ref 8.6–10.3)
CARDIAC TROPONIN I PNL SERPL HS: <3 NG/L (ref 0–13)
CARDIAC TROPONIN I PNL SERPL HS: <3 NG/L (ref 0–13)
CHLORIDE SERPL-SCNC: 103 MMOL/L (ref 98–107)
CO2 SERPL-SCNC: 27 MMOL/L (ref 21–32)
COLOR UR: COLORLESS
CREAT SERPL-MCNC: 0.88 MG/DL (ref 0.5–1.05)
EGFRCR SERPLBLD CKD-EPI 2021: 69 ML/MIN/1.73M*2
EOSINOPHIL # BLD AUTO: 0.1 X10*3/UL (ref 0–0.4)
EOSINOPHIL NFR BLD AUTO: 1.9 %
ERYTHROCYTE [DISTWIDTH] IN BLOOD BY AUTOMATED COUNT: 12.8 % (ref 11.5–14.5)
GLUCOSE SERPL-MCNC: 89 MG/DL (ref 74–99)
GLUCOSE UR STRIP.AUTO-MCNC: NORMAL MG/DL
HCT VFR BLD AUTO: 41.6 % (ref 36–46)
HGB BLD-MCNC: 13.9 G/DL (ref 12–16)
IMM GRANULOCYTES # BLD AUTO: 0.01 X10*3/UL (ref 0–0.5)
IMM GRANULOCYTES NFR BLD AUTO: 0.2 % (ref 0–0.9)
KETONES UR STRIP.AUTO-MCNC: NEGATIVE MG/DL
LEUKOCYTE ESTERASE UR QL STRIP.AUTO: NEGATIVE
LIPASE SERPL-CCNC: 18 U/L (ref 9–82)
LYMPHOCYTES # BLD AUTO: 1.98 X10*3/UL (ref 0.8–3)
LYMPHOCYTES NFR BLD AUTO: 36.9 %
MCH RBC QN AUTO: 31 PG (ref 26–34)
MCHC RBC AUTO-ENTMCNC: 33.4 G/DL (ref 32–36)
MCV RBC AUTO: 93 FL (ref 80–100)
MONOCYTES # BLD AUTO: 0.44 X10*3/UL (ref 0.05–0.8)
MONOCYTES NFR BLD AUTO: 8.2 %
NEUTROPHILS # BLD AUTO: 2.81 X10*3/UL (ref 1.6–5.5)
NEUTROPHILS NFR BLD AUTO: 52.4 %
NITRITE UR QL STRIP.AUTO: NEGATIVE
NRBC BLD-RTO: 0 /100 WBCS (ref 0–0)
PH UR STRIP.AUTO: 6 [PH]
PLATELET # BLD AUTO: 207 X10*3/UL (ref 150–450)
POTASSIUM SERPL-SCNC: 3.7 MMOL/L (ref 3.5–5.3)
PROT SERPL-MCNC: 6.5 G/DL (ref 6.4–8.2)
PROT UR STRIP.AUTO-MCNC: NEGATIVE MG/DL
RBC # BLD AUTO: 4.49 X10*6/UL (ref 4–5.2)
RBC # UR STRIP.AUTO: NEGATIVE /UL
SODIUM SERPL-SCNC: 139 MMOL/L (ref 136–145)
SP GR UR STRIP.AUTO: 1
UROBILINOGEN UR STRIP.AUTO-MCNC: NORMAL MG/DL
WBC # BLD AUTO: 5.4 X10*3/UL (ref 4.4–11.3)

## 2024-06-30 PROCEDURE — 93005 ELECTROCARDIOGRAM TRACING: CPT

## 2024-06-30 PROCEDURE — 2500000005 HC RX 250 GENERAL PHARMACY W/O HCPCS

## 2024-06-30 PROCEDURE — 83690 ASSAY OF LIPASE: CPT

## 2024-06-30 PROCEDURE — 84484 ASSAY OF TROPONIN QUANT: CPT | Mod: 91

## 2024-06-30 PROCEDURE — C9113 INJ PANTOPRAZOLE SODIUM, VIA: HCPCS

## 2024-06-30 PROCEDURE — 2500000004 HC RX 250 GENERAL PHARMACY W/ HCPCS (ALT 636 FOR OP/ED)

## 2024-06-30 PROCEDURE — 36415 COLL VENOUS BLD VENIPUNCTURE: CPT

## 2024-06-30 PROCEDURE — 84484 ASSAY OF TROPONIN QUANT: CPT

## 2024-06-30 PROCEDURE — 74176 CT ABD & PELVIS W/O CONTRAST: CPT | Performed by: RADIOLOGY

## 2024-06-30 PROCEDURE — 99285 EMERGENCY DEPT VISIT HI MDM: CPT | Mod: 25

## 2024-06-30 PROCEDURE — 84075 ASSAY ALKALINE PHOSPHATASE: CPT

## 2024-06-30 PROCEDURE — 96374 THER/PROPH/DIAG INJ IV PUSH: CPT

## 2024-06-30 PROCEDURE — 85025 COMPLETE CBC W/AUTO DIFF WBC: CPT

## 2024-06-30 PROCEDURE — 81003 URINALYSIS AUTO W/O SCOPE: CPT

## 2024-06-30 PROCEDURE — 74176 CT ABD & PELVIS W/O CONTRAST: CPT

## 2024-06-30 RX ORDER — FAMOTIDINE 20 MG/1
20 TABLET, FILM COATED ORAL 2 TIMES DAILY
Qty: 30 TABLET | Refills: 0 | Status: SHIPPED | OUTPATIENT
Start: 2024-06-30 | End: 2024-07-15

## 2024-06-30 RX ORDER — PANTOPRAZOLE SODIUM 40 MG/10ML
40 INJECTION, POWDER, LYOPHILIZED, FOR SOLUTION INTRAVENOUS ONCE
Status: COMPLETED | OUTPATIENT
Start: 2024-06-30 | End: 2024-06-30

## 2024-06-30 RX ORDER — LIDOCAINE 560 MG/1
2 PATCH PERCUTANEOUS; TOPICAL; TRANSDERMAL ONCE
Status: DISCONTINUED | OUTPATIENT
Start: 2024-06-30 | End: 2024-06-30 | Stop reason: HOSPADM

## 2024-06-30 ASSESSMENT — PAIN DESCRIPTION - LOCATION
LOCATION: ABDOMEN
LOCATION: ABDOMEN

## 2024-06-30 ASSESSMENT — PAIN - FUNCTIONAL ASSESSMENT: PAIN_FUNCTIONAL_ASSESSMENT: 0-10

## 2024-06-30 ASSESSMENT — COLUMBIA-SUICIDE SEVERITY RATING SCALE - C-SSRS
2. HAVE YOU ACTUALLY HAD ANY THOUGHTS OF KILLING YOURSELF?: NO
1. IN THE PAST MONTH, HAVE YOU WISHED YOU WERE DEAD OR WISHED YOU COULD GO TO SLEEP AND NOT WAKE UP?: NO
6. HAVE YOU EVER DONE ANYTHING, STARTED TO DO ANYTHING, OR PREPARED TO DO ANYTHING TO END YOUR LIFE?: NO

## 2024-06-30 ASSESSMENT — PAIN SCALES - GENERAL
PAINLEVEL_OUTOF10: 6
PAINLEVEL_OUTOF10: 7

## 2024-06-30 NOTE — ED PROVIDER NOTES
HPI   Chief Complaint   Patient presents with    Abdominal Pain       HPI  HISTORY OF PRESENT ILLNESS:  74 y.o. female presenting to the ED with complaint of right-sided abdominal pain for the past 2 days.  Patient states that she always has epigastric pain due to history of gastritis, which is unchanged today.  States in the past 2 to 3 days she started developing right flank pain and right lower quadrant pain.  She states this pain is new for her.  Relatively constant, worsens when palpated, and with range of motion of the trunk such as bending or twisting.  She denies any other associated symptoms.  No nausea or vomiting, no constipation or diarrhea.  Denies urinary symptoms, no dysuria, hematuria, urinary urgency or frequency.  No fevers or chills.  No chest pain or shortness of breath.  No dizziness or lightheadedness, no syncope.  Pain is not pleuritic, is not exertional.  No cough or hemoptysis.  No medications taken for symptoms.  No other complaints or symptoms voiced.    12 point review of systems was performed and is negative unless otherwise specified in HPI.    PMH: HTN, CKD, obesity, history of gastric ulcers, H. pylori infection, meningioma, history of uterine cancer s/p hysterectomy  Family history: noncontributory  Social history: non smoker, occasional ETOH, no illicit substances  Past Medical History:   Diagnosis Date    Abdominal bloating 02/05/2023    Abdominal pain 02/05/2023    Bilateral impacted cerumen 02/05/2023    Cough 02/05/2023    COVID-19 virus infection 02/05/2023    Malignant neoplasm of body of uterus, unspecified site (Multi) 04/13/2023    Personal history of malignant neoplasm of other parts of uterus 07/25/2020    History of malignant neoplasm of other parts of uterus    Shoulder pain, right 02/05/2023    Uterine cancer (Multi) 08/16/2012         Abnormal Labs Reviewed - No abnormal labs to display   CT abdomen pelvis wo IV contrast    (Results Pending)               Kenny Coma  Scale Score: 15                     Patient History   Past Medical History:   Diagnosis Date    Abdominal bloating 2023    Abdominal pain 2023    Bilateral impacted cerumen 2023    Cough 2023    COVID-19 virus infection 2023    Malignant neoplasm of body of uterus, unspecified site (Multi) 2023    Personal history of malignant neoplasm of other parts of uterus 2020    History of malignant neoplasm of other parts of uterus    Shoulder pain, right 2023    Uterine cancer (Multi) 2012     Past Surgical History:   Procedure Laterality Date    TOTAL ABDOMINAL HYSTERECTOMY W/ BILATERAL SALPINGOOPHORECTOMY  2016    Total Abdominal Hysterectomy With Removal Of Both Ovaries     Family History   Problem Relation Name Age of Onset    No Known Problems Mother      No Known Problems Father       Social History     Tobacco Use    Smoking status: Former     Current packs/day: 0.00     Types: Cigarettes     Quit date:      Years since quittin.5     Passive exposure: Never    Smokeless tobacco: Never   Substance Use Topics    Alcohol use: Yes    Drug use: Never       Physical Exam   ED Triage Vitals   Temperature Heart Rate Respirations BP   24 1320 24 1320 24 1320 24 1321   36.9 °C (98.4 °F) 78 18 135/65      Pulse Ox Temp Source Heart Rate Source Patient Position   24 1320 24 1320 24 1320 --   100 % Temporal Monitor       BP Location FiO2 (%)     -- --             Physical Exam  Constitutional:       General: She is not in acute distress.     Appearance: She is not ill-appearing, toxic-appearing or diaphoretic.   HENT:      Head: Normocephalic and atraumatic.      Nose: No congestion.      Mouth/Throat:      Mouth: Mucous membranes are moist.   Eyes:      General: No scleral icterus.     Extraocular Movements: Extraocular movements intact.   Cardiovascular:      Rate and Rhythm: Normal rate and regular rhythm.      Pulses:  Normal pulses.   Pulmonary:      Effort: Pulmonary effort is normal. No respiratory distress.   Abdominal:      General: There is no distension.      Palpations: Abdomen is soft. There is no mass or pulsatile mass.      Tenderness: There is abdominal tenderness in the right lower quadrant. There is no right CVA tenderness, left CVA tenderness, guarding or rebound.      Comments: Tenderness in the epigastric area, mild tenderness to right lower quadrant, right lateral mid abdomen, and over the right mid thoracic paraspinal musculature.  Nontender over the midline cervical, thoracic, or lumbar spine.  No motor or sensory deficits of the lower extremities.     Musculoskeletal:         General: Normal range of motion.      Cervical back: Normal range of motion and neck supple. No rigidity.      Right lower leg: No edema.      Left lower leg: No edema.   Skin:     General: Skin is warm and dry.      Capillary Refill: Capillary refill takes less than 2 seconds.   Neurological:      General: No focal deficit present.      Mental Status: She is alert and oriented to person, place, and time.      Gait: Gait normal.   Psychiatric:         Mood and Affect: Mood normal.         Behavior: Behavior normal.         Judgment: Judgment normal.         ED Course & MDM   ED Course as of 06/30/24 1614   Sun Jun 30, 2024   1455 13:44 12 lead EKG interpreted by myself and my ED attending reveals sinus rhythm with a rate of 65 beats per minute.  Normal Axis.  There are no ST elevations. T wave inversions present. No acute ischemic changes identified.  [EH]      ED Course User Index  [EH] Fabiana Montano PA-C         Diagnoses as of 06/30/24 1614   Abdominal pain, unspecified abdominal location       Medical Decision Making  ED course / MDM     Summary:  Patient presented with epigastric and right-sided abdominal pain, states that she usually gets epigastric pain due to gastritis, right lateral abdominal and flank pain is new in the past 2  to 3 days.  She denies any other associated symptoms.  Vital signs stable, patient is very well-appearing.  On exam, there is tenderness in the epigastric area, mild tenderness into the right lower quadrant, right lateral mid abdomen, and over the right mid thoracic paraspinal musculature.  No distention, no guarding or rigidity.  No peritoneal signs.  No CVA tenderness.  Lungs good auscultation, heart regular rate and rhythm.  IV established, labs drawn.  Labs show no leukocytosis, normal hemoglobin, no electrode abnormalities, normal kidney and liver function.  EKG is nonischemic, troponin is negative x 2.  Very low suspicion for ACS or MI as a cause of her symptoms.  Normal lipase.  UA noninfected.  CT scan shows no acute process, incidental findings which were discussed with the patient.  Patient was given a lidocaine patch and a dose of IV Protonix in the ED. Patient case discussed with ED attending Dr. Cantrell, who also saw and evaluated the patient. Results and differential were discussed in detail with the patient.  On reevaluation, she feels completely better, has no pain, and is eager for discharge. She is already taking a PPI, Pepcid added to her regimen for gastritis.  The right side pain may be musculoskeletal in origin. She has had a reassuring workup including labs and imaging, and symptoms have resolved.  Stable for discharge with outpatient PCP and gastroenterology follow-up. Patient was given strict return precautions, understands reasons to return to the ED. Also discussed supportive care instructions. I expressed the importance of outpatient follow up with their PCP. All questions were answered, patient expressed understanding and stated that they would comply.    Impression:  1. See diagnosis    Plan: Homegoing. I discussed the differential, results, and discharge plan with the patient and family/friend/caregiver. Patient was advised to follow up with PCP or recommended provider in 2-3 days for  another evaluation and exam. I emphasized the importance of follow-up with the physician I referred them to in the timeframe recommended.  I explained reasons for the patient to return to the Emergency Department. They agreed that if they feel their condition is worsening,  if symptoms change, get worse, new symptoms develop prior to follow up, or if they have any other concern they should call 911 immediately for further assistance. If there is no improvement in symptoms in the next 24 hours they are advised to return for further evaluation and exam. I also explained the plan and treatment course. We also discussed medications that were prescribed including common side effects and interactions. The patient was advised to abstain from driving, operating heavy machinery, or making significant decisions while taking medications such as opiates and muscle relaxers that may impair this. I gave the patient an opportunity to ask all questions they had and answered all of them accordingly. They understand return precautions and discharge instructions. Patient and family/friend/caregiver/guardian is in agreement with plan, treatment course, and follow up and states verbally that they will comply.     Disposition: Discharge    Patient seen and discussed with Dr. Cantrell    This note has been transcribed using voice recognition and may contain grammatical errors, misplaced words, incorrect words, incorrect phrases or other errors.   Procedure  Procedures     Fabiana Montano PA-C  06/30/24 1640

## 2024-06-30 NOTE — DISCHARGE INSTRUCTIONS
Take Pepcid as prescribed for suspected gastritis acid reflux symptoms.  Please follow-up with your primary care physician to assess response to medication.  Return to emergency department for reassessment if new or worsening symptoms especially severe pain, fever, persistent vomiting.

## 2024-07-01 LAB — HOLD SPECIMEN: NORMAL

## 2024-07-07 LAB
ATRIAL RATE: 65 BPM
P AXIS: 62 DEGREES
P OFFSET: 209 MS
P ONSET: 156 MS
PR INTERVAL: 142 MS
Q ONSET: 227 MS
QRS COUNT: 11 BEATS
QRS DURATION: 70 MS
QT INTERVAL: 410 MS
QTC CALCULATION(BAZETT): 426 MS
QTC FREDERICIA: 420 MS
R AXIS: -9 DEGREES
T AXIS: -5 DEGREES
T OFFSET: 432 MS
VENTRICULAR RATE: 65 BPM

## 2024-07-08 ENCOUNTER — APPOINTMENT (OUTPATIENT)
Dept: RADIOLOGY | Facility: HOSPITAL | Age: 74
End: 2024-07-08
Payer: MEDICARE

## 2024-07-15 ENCOUNTER — HOSPITAL ENCOUNTER (OUTPATIENT)
Dept: RADIOLOGY | Facility: CLINIC | Age: 74
Discharge: HOME | End: 2024-07-15
Payer: MEDICARE

## 2024-07-15 ENCOUNTER — TELEPHONE (OUTPATIENT)
Dept: GASTROENTEROLOGY | Facility: CLINIC | Age: 74
End: 2024-07-15
Payer: MEDICARE

## 2024-07-15 DIAGNOSIS — D32.9 MENINGIOMA (MULTI): ICD-10-CM

## 2024-07-15 PROCEDURE — 70551 MRI BRAIN STEM W/O DYE: CPT

## 2024-07-15 PROCEDURE — 70551 MRI BRAIN STEM W/O DYE: CPT | Performed by: RADIOLOGY

## 2024-07-15 NOTE — TELEPHONE ENCOUNTER
Pt states that the lansoprazole is not working. She is wondering if there is something else that you can prescribe. She would like to speak with you.

## 2024-07-16 NOTE — TELEPHONE ENCOUNTER
"Went to every other day on the lansoprazole and then started having more epigastric discomfort which has not gone away when she went back to daily.  She notes that in the past Dexilant 60 mg \"was like a miracle\" for her.  Told her to go to 30 mg twice a day and the lansoprazole for couple weeks, go down to once a day if she is feeling better, and let me know if she has any recurrent symptoms.  "

## 2024-07-17 ENCOUNTER — APPOINTMENT (OUTPATIENT)
Dept: RADIOLOGY | Facility: CLINIC | Age: 74
End: 2024-07-17
Payer: MEDICARE

## 2024-07-26 ENCOUNTER — APPOINTMENT (OUTPATIENT)
Dept: PRIMARY CARE | Facility: CLINIC | Age: 74
End: 2024-07-26
Payer: MEDICARE

## 2024-07-26 ENCOUNTER — HOSPITAL ENCOUNTER (OUTPATIENT)
Dept: RADIOLOGY | Facility: CLINIC | Age: 74
Discharge: HOME | End: 2024-07-26
Payer: MEDICARE

## 2024-07-26 VITALS — BODY MASS INDEX: 37.75 KG/M2 | WEIGHT: 199.96 LBS | HEIGHT: 61 IN

## 2024-07-26 DIAGNOSIS — Z12.31 VISIT FOR SCREENING MAMMOGRAM: ICD-10-CM

## 2024-07-26 PROCEDURE — 77067 SCR MAMMO BI INCL CAD: CPT

## 2024-08-01 ENCOUNTER — TELEPHONE (OUTPATIENT)
Dept: PRIMARY CARE | Facility: CLINIC | Age: 74
End: 2024-08-01
Payer: MEDICARE

## 2024-08-01 DIAGNOSIS — K21.9 GASTROESOPHAGEAL REFLUX DISEASE WITHOUT ESOPHAGITIS: Primary | ICD-10-CM

## 2024-08-01 RX ORDER — DEXLANSOPRAZOLE 60 MG/1
60 CAPSULE, DELAYED RELEASE ORAL DAILY
Qty: 30 CAPSULE | Refills: 5 | Status: SHIPPED | OUTPATIENT
Start: 2024-08-01 | End: 2025-01-28

## 2024-08-01 RX ORDER — AZITHROMYCIN 250 MG/1
TABLET, FILM COATED ORAL
Qty: 6 TABLET | Refills: 0 | Status: SHIPPED | OUTPATIENT
Start: 2024-08-01 | End: 2024-08-06

## 2024-08-01 NOTE — TELEPHONE ENCOUNTER
Patient called in and would like you to call her when you can she is having pain in her stomach and states you told her to call the office and ask for you with any issue and you will call her back.

## 2024-08-07 ENCOUNTER — APPOINTMENT (OUTPATIENT)
Dept: RADIOLOGY | Facility: HOSPITAL | Age: 74
End: 2024-08-07
Payer: MEDICARE

## 2024-08-10 ENCOUNTER — TELEPHONE (OUTPATIENT)
Dept: PRIMARY CARE | Facility: CLINIC | Age: 74
End: 2024-08-10
Payer: MEDICARE

## 2024-08-22 ENCOUNTER — APPOINTMENT (OUTPATIENT)
Dept: GYNECOLOGIC ONCOLOGY | Facility: CLINIC | Age: 74
End: 2024-08-22
Payer: MEDICARE

## 2024-08-30 DIAGNOSIS — E78.5 DYSLIPIDEMIA: ICD-10-CM

## 2024-08-30 DIAGNOSIS — K21.9 GASTROESOPHAGEAL REFLUX DISEASE WITHOUT ESOPHAGITIS: ICD-10-CM

## 2024-08-30 DIAGNOSIS — I10 PRIMARY HYPERTENSION: ICD-10-CM

## 2024-08-30 DIAGNOSIS — K25.9 GASTRIC ULCER, UNSPECIFIED CHRONICITY, UNSPECIFIED WHETHER GASTRIC ULCER HEMORRHAGE OR PERFORATION PRESENT: ICD-10-CM

## 2024-08-30 DIAGNOSIS — E87.6 POTASSIUM DEFICIENCY: ICD-10-CM

## 2024-08-30 RX ORDER — LANSOPRAZOLE 30 MG/1
30 CAPSULE, DELAYED RELEASE ORAL
Qty: 90 CAPSULE | Refills: 3 | Status: SHIPPED | OUTPATIENT
Start: 2024-08-30

## 2024-08-30 RX ORDER — POTASSIUM CHLORIDE 20 MEQ/1
40 TABLET, EXTENDED RELEASE ORAL DAILY
Qty: 180 TABLET | Refills: 1 | Status: SHIPPED | OUTPATIENT
Start: 2024-08-30

## 2024-08-30 RX ORDER — LISINOPRIL AND HYDROCHLOROTHIAZIDE 12.5; 2 MG/1; MG/1
1 TABLET ORAL DAILY
Qty: 90 TABLET | Refills: 1 | Status: SHIPPED | OUTPATIENT
Start: 2024-08-30 | End: 2025-08-30

## 2024-08-30 RX ORDER — ATORVASTATIN CALCIUM 20 MG/1
TABLET, FILM COATED ORAL
Qty: 90 TABLET | Refills: 3 | Status: SHIPPED | OUTPATIENT
Start: 2024-08-30

## 2024-08-30 RX ORDER — DEXLANSOPRAZOLE 60 MG/1
60 CAPSULE, DELAYED RELEASE ORAL DAILY
Qty: 30 CAPSULE | Refills: 5 | Status: SHIPPED | OUTPATIENT
Start: 2024-08-30 | End: 2025-02-26

## 2024-09-05 ENCOUNTER — OFFICE VISIT (OUTPATIENT)
Dept: GYNECOLOGIC ONCOLOGY | Facility: CLINIC | Age: 74
End: 2024-09-05
Payer: MEDICARE

## 2024-09-05 VITALS
HEART RATE: 81 BPM | DIASTOLIC BLOOD PRESSURE: 73 MMHG | RESPIRATION RATE: 18 BRPM | WEIGHT: 204.37 LBS | OXYGEN SATURATION: 98 % | BODY MASS INDEX: 38.63 KG/M2 | TEMPERATURE: 98.1 F | SYSTOLIC BLOOD PRESSURE: 117 MMHG

## 2024-09-05 DIAGNOSIS — Z85.42 HISTORY OF UTERINE CANCER: Primary | ICD-10-CM

## 2024-09-05 PROCEDURE — 1159F MED LIST DOCD IN RCRD: CPT | Performed by: NURSE PRACTITIONER

## 2024-09-05 PROCEDURE — 3078F DIAST BP <80 MM HG: CPT | Performed by: NURSE PRACTITIONER

## 2024-09-05 PROCEDURE — 1126F AMNT PAIN NOTED NONE PRSNT: CPT | Performed by: NURSE PRACTITIONER

## 2024-09-05 PROCEDURE — 3074F SYST BP LT 130 MM HG: CPT | Performed by: NURSE PRACTITIONER

## 2024-09-05 PROCEDURE — 99214 OFFICE O/P EST MOD 30 MIN: CPT | Performed by: NURSE PRACTITIONER

## 2024-09-05 ASSESSMENT — PAIN SCALES - GENERAL: PAINLEVEL: 0-NO PAIN

## 2024-09-05 NOTE — PROGRESS NOTES
Patient ID: Ana Lira is a 74 y.o. female.  Referring Physician: No referring provider defined for this encounter.  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  Followed by 3 cycles of carboplatin and paclitaxel completed in October 2012.     Had some grade 2 neuropathy in hands and feet toward the end of her treatment.  Neuropathy has improved since completing chemotherapy.      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 Here for annual exam. Patient denies any vaginal bleeding, pink tinged discharge. Patient denies any constipation or diarrhea. Appetite has been good. Energy levels are baseline. Patient denies hematuria. Patient denies urinary leakage or incontinence. Mammogram is due in July. Had a colonoscopy and endoscopy in April 2022. She underwent a laparoscopic hernia repair and lysis of adhesions in April 2022 due to abdominal pain.     Objective    BSA: 2 meters squared  /73   Pulse 81   Temp 36.7 °C (98.1 °F)   Resp 18   Wt 92.7 kg (204 lb 5.9 oz)   SpO2 98%   BMI 38.63 kg/m²      Physical Exam  Vitals and nursing note reviewed.   Constitutional:       Appearance: Normal appearance. She is normal weight.   HENT:       Mouth/Throat:      Mouth: Mucous membranes are moist.      Pharynx: Oropharynx is clear.   Eyes:      Conjunctiva/sclera: Conjunctivae normal.      Pupils: Pupils are equal, round, and reactive to light.   Cardiovascular:      Rate and Rhythm: Normal rate and regular rhythm.   Pulmonary:      Effort: Pulmonary effort is normal.      Breath sounds: Normal breath sounds.   Abdominal:      General: Abdomen is flat. There is no distension.      Palpations: Abdomen is soft. There is no mass.      Tenderness: There is no abdominal tenderness.   Genitourinary:     General: Normal vulva.      Vagina: Normal.      Uterus: Absent.       Rectum: Normal.   Musculoskeletal:         General: Normal range of motion.   Skin:     General: Skin is warm and dry.   Neurological:      Mental Status: She is alert.   Psychiatric:         Mood and Affect: Mood normal.         Behavior: Behavior normal.         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 TAMEKA 13 years.     Plan:    Physical examination was within normal limits today.  She is currently TAMEKA.  We reviewed signs and symptoms of possible recurrence with the patient and she will call  our office should she experience any of these.      Follow up in 1 year or sooner if needed

## 2024-09-17 ENCOUNTER — HOSPITAL ENCOUNTER (OUTPATIENT)
Dept: RADIOLOGY | Facility: CLINIC | Age: 74
Discharge: HOME | End: 2024-09-17
Payer: MEDICARE

## 2024-09-17 DIAGNOSIS — E04.1 THYROID NODULE: ICD-10-CM

## 2024-09-17 PROCEDURE — 76536 US EXAM OF HEAD AND NECK: CPT | Performed by: RADIOLOGY

## 2024-09-17 PROCEDURE — 76536 US EXAM OF HEAD AND NECK: CPT

## 2024-09-24 ENCOUNTER — APPOINTMENT (OUTPATIENT)
Dept: PRIMARY CARE | Facility: CLINIC | Age: 74
End: 2024-09-24
Payer: MEDICARE

## 2024-09-24 ENCOUNTER — LAB (OUTPATIENT)
Dept: LAB | Facility: LAB | Age: 74
End: 2024-09-24
Payer: MEDICARE

## 2024-09-24 VITALS
DIASTOLIC BLOOD PRESSURE: 84 MMHG | HEIGHT: 60 IN | BODY MASS INDEX: 39.38 KG/M2 | SYSTOLIC BLOOD PRESSURE: 135 MMHG | HEART RATE: 83 BPM | WEIGHT: 200.6 LBS

## 2024-09-24 DIAGNOSIS — I10 ESSENTIAL HYPERTENSION: ICD-10-CM

## 2024-09-24 DIAGNOSIS — E66.01 CLASS 2 SEVERE OBESITY WITH SERIOUS COMORBIDITY AND BODY MASS INDEX (BMI) OF 38.0 TO 38.9 IN ADULT, UNSPECIFIED OBESITY TYPE: ICD-10-CM

## 2024-09-24 DIAGNOSIS — K29.70 GASTRITIS WITHOUT BLEEDING, UNSPECIFIED CHRONICITY, UNSPECIFIED GASTRITIS TYPE: ICD-10-CM

## 2024-09-24 DIAGNOSIS — E55.9 VITAMIN D DEFICIENCY: ICD-10-CM

## 2024-09-24 DIAGNOSIS — E78.5 DYSLIPIDEMIA: ICD-10-CM

## 2024-09-24 DIAGNOSIS — Z00.00 ROUTINE GENERAL MEDICAL EXAMINATION AT A HEALTH CARE FACILITY: Primary | ICD-10-CM

## 2024-09-24 LAB
ALBUMIN SERPL BCP-MCNC: 4.2 G/DL (ref 3.4–5)
ALP SERPL-CCNC: 63 U/L (ref 33–136)
ALT SERPL W P-5'-P-CCNC: 16 U/L (ref 7–45)
ANION GAP SERPL CALC-SCNC: 14 MMOL/L (ref 10–20)
AST SERPL W P-5'-P-CCNC: 24 U/L (ref 9–39)
BILIRUB SERPL-MCNC: 1.5 MG/DL (ref 0–1.2)
BUN SERPL-MCNC: 8 MG/DL (ref 6–23)
CALCIUM SERPL-MCNC: 9.4 MG/DL (ref 8.6–10.6)
CHLORIDE SERPL-SCNC: 101 MMOL/L (ref 98–107)
CO2 SERPL-SCNC: 30 MMOL/L (ref 21–32)
CREAT SERPL-MCNC: 0.78 MG/DL (ref 0.5–1.05)
EGFRCR SERPLBLD CKD-EPI 2021: 80 ML/MIN/1.73M*2
GLUCOSE SERPL-MCNC: 104 MG/DL (ref 74–99)
POTASSIUM SERPL-SCNC: 4.1 MMOL/L (ref 3.5–5.3)
PROT SERPL-MCNC: 6.8 G/DL (ref 6.4–8.2)
SODIUM SERPL-SCNC: 141 MMOL/L (ref 136–145)

## 2024-09-24 PROCEDURE — 36415 COLL VENOUS BLD VENIPUNCTURE: CPT

## 2024-09-24 PROCEDURE — 85027 COMPLETE CBC AUTOMATED: CPT

## 2024-09-24 PROCEDURE — 80053 COMPREHEN METABOLIC PANEL: CPT

## 2024-09-24 RX ORDER — ERGOCALCIFEROL 1.25 MG/1
CAPSULE ORAL
Qty: 12 CAPSULE | Refills: 1 | Status: SHIPPED | OUTPATIENT
Start: 2024-09-24

## 2024-09-24 RX ORDER — SUCRALFATE 1 G/1
1 TABLET ORAL
Qty: 120 TABLET | Refills: 3 | Status: SHIPPED | OUTPATIENT
Start: 2024-09-24

## 2024-09-24 RX ORDER — SUCRALFATE 1 G/1
1 TABLET ORAL
COMMUNITY
End: 2024-09-24 | Stop reason: SDUPTHER

## 2024-09-24 ASSESSMENT — ENCOUNTER SYMPTOMS
LOSS OF SENSATION IN FEET: 0
OCCASIONAL FEELINGS OF UNSTEADINESS: 0
DEPRESSION: 0

## 2024-09-24 ASSESSMENT — PATIENT HEALTH QUESTIONNAIRE - PHQ9
SUM OF ALL RESPONSES TO PHQ9 QUESTIONS 1 AND 2: 0
1. LITTLE INTEREST OR PLEASURE IN DOING THINGS: NOT AT ALL
2. FEELING DOWN, DEPRESSED OR HOPELESS: NOT AT ALL

## 2024-09-24 NOTE — PROGRESS NOTES
Subjective     Patient ID: Ana Lira is a 74 y.o. female who presents for Follow-up (3 mo fuv) and Immunizations (Flu vaccine).      Last Physical : 1 Years ago     Pt's PMH, PSH, SH, FH , meds and allergies was obtained / reviewed and updated .     Dental visits : Y  Vision issues : N  Hearing issues : N    Immunizations : Y    Diet :  could be better  Exercise:  Weight concerns :     Alcohol: as noted in SH  Tobacco: as noted in SH  Recreational drug use : None/ as noted in     Premenopausal/perimenopausal/ postmenopausal:      Parity:1  Full term:    Premature:   (s):   Living :1  Ab induced:   Ab spontaneous :  Ectopic :   Multiple :    PAP smear : endometrial CA  Mammogram :  Colonoscopy:utd    Metabolic screening   - Lipid   - Glucose    Patient was in ER for abd pain Sarted Carafate taking it once a day whic hhelped.   BP controlled  No n/v has appt with GI  Meningioma Had another MRI stable follow up in a year   ======================================================    Visit Vitals  /84   Pulse 83   Ht 1.524 m (5')   Wt 91 kg (200 lb 9.6 oz)   BMI 39.18 kg/m²   OB Status Hysterectomy   Smoking Status Former   BSA 1.96 m²      No LMP recorded. Patient has had a hysterectomy.     =====================================    Review of systems:  Constitutional: no chills, no fever and no night sweats.     Eyes: no blurred vision and no eyesight problems.     ENT: no hearing loss, no nasal congestion, no nasal discharge, no hoarseness and no sore throat.     Cardiovascular: no chest pain, no intermittent leg claudication, no lower extremity edema, no palpitations and no syncope.     Respiratory: no cough, no shortness of breath during exertion, no shortness of breath at rest and no wheezing.     Gastrointestinal: , no blood in stools, no constipation, no diarrhea, no melena, no nausea, no rectal pain and no vomiting.     Genitourinary: no dysuria, no change in urinary frequency, no urinary  hesitancy, no feelings of urinary urgency and no vaginal discharge.     Musculoskeletal: no myalgias.     Integumentary: no new skin lesions and no rashes.     Neurological: no difficulty walking, no headache, no limb weakness, no numbness and no tingling.     Psychiatric: no anxiety, no depression, no anhedonia and no substance use disorders.     Endocrine: no recent weight gain and no recent weight loss.     Hematologic/Lymphatic: no tendency for easy bruising and no swollen glands.   ============================================================    Physical exam :    Constitutional: Alert and in no acute distress. Well developed, well nourished.     Eyes: Normal external exam. Pupils were equal in size, round, reactive to light (PERRL) with normal accommodation and extraocular movements intact (EOMI).     Ears, Nose, Mouth, and Throat: External inspection of ears and nose: Normal.  Otoscopic examination: Normal.      Neck: No neck mass was observed. Supple.     Cardiovascular: Heart rate and rhythm were normal, normal S1 and S2, no gallops, no murmurs and no pericardial rub    Pulmonary: No respiratory distress. Clear bilateral breath sounds.     Abdomen: Soft nontender; no abdominal mass palpated. No organomegaly.     Musculoskeletal:   Muscle strength/tone: Normal.      Skin: Normal skin color and pigmentation, normal skin turgor, and no rash.     Neurologic: Deep tendon reflexes were 2+ and symmetric. Sensation: Normal.     Psychiatric: Judgment and insight: Intact. Mood and affect: Normal.    Lymphatic : Cervical/ axillary/ groin Lns Palpable/ non palpable            All other systems have been reviewed and are negative for complaint.      Assessment/Plan    Problem List Items Addressed This Visit             ICD-10-CM    Class 2 severe obesity with serious comorbidity in adult (Multi) E66.01     patient is advised to lose weight by reducing calorie intake and increasing activity levels, especially aerobic  exercise          Relevant Orders    CBC (Completed)    Comprehensive Metabolic Panel (Completed)    Dyslipidemia E78.5     Check lipid panel continue medications continue healthy eating work out  150 minutes a week            Vitamin D deficiency E55.9    Relevant Medications    ergocalciferol (Vitamin D-2) 1.25 MG (53137 UT) capsule    Other Relevant Orders    CBC (Completed)    Comprehensive Metabolic Panel (Completed)    Essential hypertension I10     Will recheck blood pressure is 118/78 patient will monitor closely at home call back if she has low numbers maintain water intake         Routine general medical examination at a health care facility - Primary Z00.00    Gastritis K29.70     C/w Carafate which has provided relief. Has GI follow up .          Relevant Medications    sucralfate (Carafate) 1 gram tablet    Other Relevant Orders    CBC (Completed)    Comprehensive Metabolic Panel (Completed)

## 2024-09-25 LAB
ERYTHROCYTE [DISTWIDTH] IN BLOOD BY AUTOMATED COUNT: 13 % (ref 11.5–14.5)
HCT VFR BLD AUTO: 42.3 % (ref 36–46)
HGB BLD-MCNC: 14.1 G/DL (ref 12–16)
MCH RBC QN AUTO: 31 PG (ref 26–34)
MCHC RBC AUTO-ENTMCNC: 33.3 G/DL (ref 32–36)
MCV RBC AUTO: 93 FL (ref 80–100)
NRBC BLD-RTO: 0 /100 WBCS (ref 0–0)
PLATELET # BLD AUTO: 242 X10*3/UL (ref 150–450)
RBC # BLD AUTO: 4.55 X10*6/UL (ref 4–5.2)
WBC # BLD AUTO: 6.7 X10*3/UL (ref 4.4–11.3)

## 2024-09-26 PROBLEM — A04.8 H. PYLORI INFECTION: Status: RESOLVED | Noted: 2023-02-05 | Resolved: 2024-09-26

## 2024-09-26 PROBLEM — K29.70 HELICOBACTER POSITIVE GASTRITIS: Status: RESOLVED | Noted: 2023-02-05 | Resolved: 2024-09-26

## 2024-09-26 PROBLEM — B96.81 HELICOBACTER POSITIVE GASTRITIS: Status: RESOLVED | Noted: 2023-02-05 | Resolved: 2024-09-26

## 2024-09-26 NOTE — ASSESSMENT & PLAN NOTE
Will recheck blood pressure is 118/78 patient will monitor closely at home call back if she has low numbers maintain water intake

## 2024-10-07 ENCOUNTER — APPOINTMENT (OUTPATIENT)
Dept: GASTROENTEROLOGY | Facility: CLINIC | Age: 74
End: 2024-10-07
Payer: MEDICARE

## 2024-10-09 ENCOUNTER — TELEPHONE (OUTPATIENT)
Dept: SURGERY | Facility: CLINIC | Age: 74
End: 2024-10-09
Payer: MEDICARE

## 2024-10-09 NOTE — TELEPHONE ENCOUNTER
Call to patient. No answer. Left voicemail message notifying patient that Dr. Pollack has reviewed the results from her most recent thyroid US which show her nodules are stable. They still do meet criteria for biopsy which was previously declined. Requested callback from patient to notify us if she would like to purse thyroid biopsy or continue with US surveillance 1 year from now. Callback number provided.

## 2024-10-28 ENCOUNTER — TELEPHONE (OUTPATIENT)
Dept: SURGERY | Facility: CLINIC | Age: 74
End: 2024-10-28
Payer: MEDICARE

## 2024-10-28 DIAGNOSIS — E04.1 THYROID NODULE: ICD-10-CM

## 2024-11-18 ENCOUNTER — OFFICE VISIT (OUTPATIENT)
Dept: PRIMARY CARE | Facility: CLINIC | Age: 74
End: 2024-11-18
Payer: MEDICARE

## 2024-11-18 VITALS
HEART RATE: 77 BPM | HEIGHT: 60 IN | SYSTOLIC BLOOD PRESSURE: 123 MMHG | DIASTOLIC BLOOD PRESSURE: 83 MMHG | BODY MASS INDEX: 40.05 KG/M2 | WEIGHT: 204 LBS

## 2024-11-18 DIAGNOSIS — J01.00 ACUTE NON-RECURRENT MAXILLARY SINUSITIS: Primary | ICD-10-CM

## 2024-11-18 DIAGNOSIS — I10 ESSENTIAL HYPERTENSION: ICD-10-CM

## 2024-11-18 DIAGNOSIS — E78.5 DYSLIPIDEMIA: ICD-10-CM

## 2024-11-18 PROCEDURE — 3008F BODY MASS INDEX DOCD: CPT | Performed by: FAMILY MEDICINE

## 2024-11-18 PROCEDURE — 3079F DIAST BP 80-89 MM HG: CPT | Performed by: FAMILY MEDICINE

## 2024-11-18 PROCEDURE — 99214 OFFICE O/P EST MOD 30 MIN: CPT | Performed by: FAMILY MEDICINE

## 2024-11-18 PROCEDURE — G2211 COMPLEX E/M VISIT ADD ON: HCPCS | Performed by: FAMILY MEDICINE

## 2024-11-18 PROCEDURE — 3074F SYST BP LT 130 MM HG: CPT | Performed by: FAMILY MEDICINE

## 2024-11-18 PROCEDURE — 1159F MED LIST DOCD IN RCRD: CPT | Performed by: FAMILY MEDICINE

## 2024-11-18 RX ORDER — BENZONATATE 200 MG/1
200 CAPSULE ORAL 3 TIMES DAILY PRN
Qty: 42 CAPSULE | Refills: 0 | Status: SHIPPED | OUTPATIENT
Start: 2024-11-18 | End: 2024-12-18

## 2024-11-18 RX ORDER — AMOXICILLIN AND CLAVULANATE POTASSIUM 875; 125 MG/1; MG/1
875 TABLET, FILM COATED ORAL 2 TIMES DAILY
Qty: 14 TABLET | Refills: 0 | Status: SHIPPED | OUTPATIENT
Start: 2024-11-18 | End: 2024-11-25

## 2024-11-18 RX ORDER — METHYLPREDNISOLONE 4 MG/1
TABLET ORAL
Qty: 21 TABLET | Refills: 0 | Status: SHIPPED | OUTPATIENT
Start: 2024-11-18 | End: 2024-11-25

## 2024-11-18 ASSESSMENT — ENCOUNTER SYMPTOMS
OCCASIONAL FEELINGS OF UNSTEADINESS: 0
DEPRESSION: 0
LOSS OF SENSATION IN FEET: 0

## 2024-11-30 PROBLEM — J01.00 ACUTE NON-RECURRENT MAXILLARY SINUSITIS: Status: ACTIVE | Noted: 2024-11-30

## 2024-11-30 ASSESSMENT — ENCOUNTER SYMPTOMS
SORE THROAT: 1
ACTIVITY CHANGE: 1
HEADACHES: 1
CHILLS: 1
FATIGUE: 1
RHINORRHEA: 1
COUGH: 1
SINUS PRESSURE: 1
FEVER: 1
SINUS PAIN: 1

## 2024-11-30 NOTE — PROGRESS NOTES
Subjective   Patient ID: Ana Lira is a 74 y.o. female who presents for Headache, Sore Throat, Sinusitis, and Earache (Both ears ache when blowing nose).      Headache   Associated symptoms include coughing, ear pain, a fever, rhinorrhea, sinus pressure and a sore throat.   Sore Throat   Associated symptoms include congestion, coughing, ear pain and headaches.   Sinusitis  Associated symptoms include chills, congestion, coughing, ear pain, headaches, sinus pressure and a sore throat.   Earache   Associated symptoms include coughing, headaches, rhinorrhea and a sore throat.     Current Outpatient Medications on File Prior to Visit   Medication Sig Dispense Refill    atorvastatin (Lipitor) 20 mg tablet Take 1 tablet daily  APOT TAB 90 tablet 3    dexlansoprazole (Dexilant) 60 mg DR capsule Take 1 capsule (60 mg) by mouth once daily. Do not crush or chew. 30 capsule 5    ergocalciferol (Vitamin D-2) 1.25 MG (03083 UT) capsule TAKE 1 CAPSULE Weekly 12 capsule 1    famotidine (Pepcid) 20 mg tablet Take 1 tablet (20 mg) by mouth 2 times a day for 15 days. 30 tablet 0    gabapentin (Neurontin) 100 mg capsule Take 1 capsule (100 mg) by mouth 3 times a day. 90 capsule 0    lansoprazole (Prevacid) 30 mg DR capsule Take 1 capsule (30 mg) by mouth once daily in the morning. Take before meals. 90 capsule 3    lisinopriL-hydrochlorothiazide 20-12.5 mg tablet Take 1 tablet by mouth once daily. 90 tablet 1    potassium chloride CR 20 mEq ER tablet Take 2 tablets (40 mEq) by mouth once daily. Do not crush or chew. 180 tablet 1    sucralfate (Carafate) 1 gram tablet Take 1 tablet (1 g) by mouth 4 times a day before meals. 120 tablet 3     No current facility-administered medications on file prior to visit.        Review of Systems   Constitutional:  Positive for activity change, chills, fatigue and fever.   HENT:  Positive for congestion, ear pain, postnasal drip, rhinorrhea, sinus pressure, sinus pain and sore throat.     Respiratory:  Positive for cough.    Neurological:  Positive for headaches.       Objective   /83   Pulse 77   Ht 1.524 m (5')   Wt 92.5 kg (204 lb)   BMI 39.84 kg/m²   BSA: 1.98 meters squared  Growth percentiles: Facility age limit for growth %tl is 20 years. Facility age limit for growth %tl is 20 years.   No visits with results within 1 Week(s) from this visit.   Latest known visit with results is:   Lab on 09/24/2024   Component Date Value Ref Range Status    WBC 09/24/2024 6.7  4.4 - 11.3 x10*3/uL Final    nRBC 09/24/2024 0.0  0.0 - 0.0 /100 WBCs Final    RBC 09/24/2024 4.55  4.00 - 5.20 x10*6/uL Final    Hemoglobin 09/24/2024 14.1  12.0 - 16.0 g/dL Final    Hematocrit 09/24/2024 42.3  36.0 - 46.0 % Final    MCV 09/24/2024 93  80 - 100 fL Final    MCH 09/24/2024 31.0  26.0 - 34.0 pg Final    MCHC 09/24/2024 33.3  32.0 - 36.0 g/dL Final    RDW 09/24/2024 13.0  11.5 - 14.5 % Final    Platelets 09/24/2024 242  150 - 450 x10*3/uL Final    Glucose 09/24/2024 104 (H)  74 - 99 mg/dL Final    Sodium 09/24/2024 141  136 - 145 mmol/L Final    Potassium 09/24/2024 4.1  3.5 - 5.3 mmol/L Final    Chloride 09/24/2024 101  98 - 107 mmol/L Final    Bicarbonate 09/24/2024 30  21 - 32 mmol/L Final    Anion Gap 09/24/2024 14  10 - 20 mmol/L Final    Urea Nitrogen 09/24/2024 8  6 - 23 mg/dL Final    Creatinine 09/24/2024 0.78  0.50 - 1.05 mg/dL Final    eGFR 09/24/2024 80  >60 mL/min/1.73m*2 Final    Calculations of estimated GFR are performed using the 2021 CKD-EPI Study Refit equation without the race variable for the IDMS-Traceable creatinine methods.  https://jasn.asnjournals.org/content/early/2021/09/22/ASN.4129714703    Calcium 09/24/2024 9.4  8.6 - 10.6 mg/dL Final    Albumin 09/24/2024 4.2  3.4 - 5.0 g/dL Final    Alkaline Phosphatase 09/24/2024 63  33 - 136 U/L Final    Total Protein 09/24/2024 6.8  6.4 - 8.2 g/dL Final    AST 09/24/2024 24  9 - 39 U/L Final    Bilirubin, Total 09/24/2024 1.5 (H)  0.0 -  1.2 mg/dL Final    ALT 09/24/2024 16  7 - 45 U/L Final    Patients treated with Sulfasalazine may generate falsely decreased results for ALT.      Physical Exam  Constitutional:       Appearance: Normal appearance.   HENT:      Right Ear: Tympanic membrane normal.      Left Ear: Tympanic membrane normal.      Nose: Congestion present.      Mouth/Throat:      Pharynx: Oropharyngeal exudate and posterior oropharyngeal erythema present.   Cardiovascular:      Rate and Rhythm: Normal rate and regular rhythm.   Pulmonary:      Effort: Pulmonary effort is normal.      Breath sounds: Normal breath sounds.   Abdominal:      General: Bowel sounds are normal.   Neurological:      General: No focal deficit present.      Mental Status: She is alert.   Psychiatric:         Mood and Affect: Mood normal.         Assessment/Plan   Problem List Items Addressed This Visit       Dyslipidemia     Check lipid panel continue medications continue healthy eating work out  150 minutes a week           Essential hypertension     Will recheck blood pressure is 118/78 patient will monitor closely at home call back if she has low numbers maintain water intake         Acute non-recurrent maxillary sinusitis - Primary    Relevant Medications    benzonatate (Tessalon) 200 mg capsule

## 2024-12-12 ENCOUNTER — LAB (OUTPATIENT)
Dept: LAB | Facility: LAB | Age: 74
End: 2024-12-12
Payer: MEDICARE

## 2024-12-12 ENCOUNTER — OFFICE VISIT (OUTPATIENT)
Dept: PRIMARY CARE | Facility: CLINIC | Age: 74
End: 2024-12-12
Payer: MEDICARE

## 2024-12-12 VITALS
HEIGHT: 60 IN | HEART RATE: 76 BPM | BODY MASS INDEX: 40.64 KG/M2 | WEIGHT: 207 LBS | SYSTOLIC BLOOD PRESSURE: 134 MMHG | DIASTOLIC BLOOD PRESSURE: 79 MMHG

## 2024-12-12 DIAGNOSIS — E66.812 CLASS 2 SEVERE OBESITY WITH SERIOUS COMORBIDITY AND BODY MASS INDEX (BMI) OF 38.0 TO 38.9 IN ADULT, UNSPECIFIED OBESITY TYPE: ICD-10-CM

## 2024-12-12 DIAGNOSIS — E66.01 CLASS 2 SEVERE OBESITY WITH SERIOUS COMORBIDITY AND BODY MASS INDEX (BMI) OF 38.0 TO 38.9 IN ADULT, UNSPECIFIED OBESITY TYPE: ICD-10-CM

## 2024-12-12 DIAGNOSIS — J01.00 ACUTE NON-RECURRENT MAXILLARY SINUSITIS: ICD-10-CM

## 2024-12-12 DIAGNOSIS — J01.00 ACUTE NON-RECURRENT MAXILLARY SINUSITIS: Primary | ICD-10-CM

## 2024-12-12 DIAGNOSIS — I10 ESSENTIAL HYPERTENSION: ICD-10-CM

## 2024-12-12 DIAGNOSIS — Z00.00 ANNUAL PHYSICAL EXAM: ICD-10-CM

## 2024-12-12 LAB
BASOPHILS # BLD AUTO: 0.01 X10*3/UL (ref 0–0.1)
BASOPHILS NFR BLD AUTO: 0.2 %
EOSINOPHIL # BLD AUTO: 0.1 X10*3/UL (ref 0–0.4)
EOSINOPHIL NFR BLD AUTO: 2.3 %
ERYTHROCYTE [DISTWIDTH] IN BLOOD BY AUTOMATED COUNT: 12.9 % (ref 11.5–14.5)
HCT VFR BLD AUTO: 44.6 % (ref 36–46)
HGB BLD-MCNC: 14.7 G/DL (ref 12–16)
IMM GRANULOCYTES # BLD AUTO: 0.01 X10*3/UL (ref 0–0.5)
IMM GRANULOCYTES NFR BLD AUTO: 0.2 % (ref 0–0.9)
LYMPHOCYTES # BLD AUTO: 1.44 X10*3/UL (ref 0.8–3)
LYMPHOCYTES NFR BLD AUTO: 32.7 %
MCH RBC QN AUTO: 30.4 PG (ref 26–34)
MCHC RBC AUTO-ENTMCNC: 33 G/DL (ref 32–36)
MCV RBC AUTO: 92 FL (ref 80–100)
MONOCYTES # BLD AUTO: 0.52 X10*3/UL (ref 0.05–0.8)
MONOCYTES NFR BLD AUTO: 11.8 %
NEUTROPHILS # BLD AUTO: 2.33 X10*3/UL (ref 1.6–5.5)
NEUTROPHILS NFR BLD AUTO: 52.8 %
NRBC BLD-RTO: 0 /100 WBCS (ref 0–0)
PLATELET # BLD AUTO: 226 X10*3/UL (ref 150–450)
RBC # BLD AUTO: 4.83 X10*6/UL (ref 4–5.2)
WBC # BLD AUTO: 4.4 X10*3/UL (ref 4.4–11.3)

## 2024-12-12 PROCEDURE — 85025 COMPLETE CBC W/AUTO DIFF WBC: CPT

## 2024-12-12 PROCEDURE — 80053 COMPREHEN METABOLIC PANEL: CPT

## 2024-12-12 PROCEDURE — 36415 COLL VENOUS BLD VENIPUNCTURE: CPT

## 2024-12-12 PROCEDURE — 87635 SARS-COV-2 COVID-19 AMP PRB: CPT

## 2024-12-12 RX ORDER — CEFUROXIME AXETIL 500 MG/1
500 TABLET ORAL 2 TIMES DAILY
Qty: 14 TABLET | Refills: 0 | Status: SHIPPED | OUTPATIENT
Start: 2024-12-12 | End: 2024-12-19

## 2024-12-12 ASSESSMENT — ENCOUNTER SYMPTOMS
DEPRESSION: 0
OCCASIONAL FEELINGS OF UNSTEADINESS: 0
LOSS OF SENSATION IN FEET: 0

## 2024-12-13 LAB
ALBUMIN SERPL BCP-MCNC: 3.9 G/DL (ref 3.4–5)
ALP SERPL-CCNC: 88 U/L (ref 33–136)
ALT SERPL W P-5'-P-CCNC: 68 U/L (ref 7–45)
ANION GAP SERPL CALC-SCNC: 15 MMOL/L (ref 10–20)
AST SERPL W P-5'-P-CCNC: 51 U/L (ref 9–39)
BILIRUB SERPL-MCNC: 0.7 MG/DL (ref 0–1.2)
BUN SERPL-MCNC: 7 MG/DL (ref 6–23)
CALCIUM SERPL-MCNC: 9.1 MG/DL (ref 8.6–10.6)
CHLORIDE SERPL-SCNC: 100 MMOL/L (ref 98–107)
CO2 SERPL-SCNC: 27 MMOL/L (ref 21–32)
CREAT SERPL-MCNC: 0.83 MG/DL (ref 0.5–1.05)
EGFRCR SERPLBLD CKD-EPI 2021: 75 ML/MIN/1.73M*2
GLUCOSE SERPL-MCNC: 84 MG/DL (ref 74–99)
POTASSIUM SERPL-SCNC: 3.8 MMOL/L (ref 3.5–5.3)
PROT SERPL-MCNC: 6.8 G/DL (ref 6.4–8.2)
SARS-COV-2 RNA RESP QL NAA+PROBE: NOT DETECTED
SODIUM SERPL-SCNC: 138 MMOL/L (ref 136–145)

## 2024-12-14 PROBLEM — Z00.00 ANNUAL PHYSICAL EXAM: Status: ACTIVE | Noted: 2024-12-14

## 2024-12-14 ASSESSMENT — ENCOUNTER SYMPTOMS
FEVER: 1
CHILLS: 1
RHINORRHEA: 1
SINUS PRESSURE: 1
ACTIVITY CHANGE: 1
COUGH: 1
SINUS PAIN: 1
FATIGUE: 1
HEADACHES: 1
SORE THROAT: 1

## 2024-12-15 NOTE — PROGRESS NOTES
Subjective   Patient ID: Ana Lira is a 74 y.o. female who presents for Follow-up.      Headache   Associated symptoms include coughing, ear pain, a fever, rhinorrhea, sinus pressure and a sore throat.   Sore Throat   Associated symptoms include congestion, coughing, ear pain and headaches.   Sinusitis  Associated symptoms include chills, congestion, coughing, ear pain, headaches, sinus pressure and a sore throat.   Earache   Associated symptoms include coughing, headaches, rhinorrhea and a sore throat.     Current Outpatient Medications on File Prior to Visit   Medication Sig Dispense Refill    atorvastatin (Lipitor) 20 mg tablet Take 1 tablet daily  APOT TAB 90 tablet 3    benzonatate (Tessalon) 200 mg capsule Take 1 capsule (200 mg) by mouth 3 times a day as needed for cough. Do not crush or chew. 42 capsule 0    dexlansoprazole (Dexilant) 60 mg DR capsule Take 1 capsule (60 mg) by mouth once daily. Do not crush or chew. 30 capsule 5    ergocalciferol (Vitamin D-2) 1.25 MG (94085 UT) capsule TAKE 1 CAPSULE Weekly 12 capsule 1    famotidine (Pepcid) 20 mg tablet Take 1 tablet (20 mg) by mouth 2 times a day for 15 days. 30 tablet 0    gabapentin (Neurontin) 100 mg capsule Take 1 capsule (100 mg) by mouth 3 times a day. 90 capsule 0    lansoprazole (Prevacid) 30 mg DR capsule Take 1 capsule (30 mg) by mouth once daily in the morning. Take before meals. 90 capsule 3    lisinopriL-hydrochlorothiazide 20-12.5 mg tablet Take 1 tablet by mouth once daily. 90 tablet 1    potassium chloride CR 20 mEq ER tablet Take 2 tablets (40 mEq) by mouth once daily. Do not crush or chew. 180 tablet 1    sucralfate (Carafate) 1 gram tablet Take 1 tablet (1 g) by mouth 4 times a day before meals. 120 tablet 3     No current facility-administered medications on file prior to visit.        Review of Systems   Constitutional:  Positive for activity change, chills, fatigue and fever.   HENT:  Positive for congestion, ear pain, postnasal  drip, rhinorrhea, sinus pressure, sinus pain and sore throat.    Respiratory:  Positive for cough.    Neurological:  Positive for headaches.       Objective   /79   Pulse 76   Ht 1.524 m (5')   Wt 93.9 kg (207 lb)   BMI 40.43 kg/m²   BSA: 1.99 meters squared  Growth percentiles: Facility age limit for growth %tl is 20 years. Facility age limit for growth %tl is 20 years.   Lab on 12/12/2024   Component Date Value Ref Range Status    WBC 12/12/2024 4.4  4.4 - 11.3 x10*3/uL Final    nRBC 12/12/2024 0.0  0.0 - 0.0 /100 WBCs Final    RBC 12/12/2024 4.83  4.00 - 5.20 x10*6/uL Final    Hemoglobin 12/12/2024 14.7  12.0 - 16.0 g/dL Final    Hematocrit 12/12/2024 44.6  36.0 - 46.0 % Final    MCV 12/12/2024 92  80 - 100 fL Final    MCH 12/12/2024 30.4  26.0 - 34.0 pg Final    MCHC 12/12/2024 33.0  32.0 - 36.0 g/dL Final    RDW 12/12/2024 12.9  11.5 - 14.5 % Final    Platelets 12/12/2024 226  150 - 450 x10*3/uL Final    Neutrophils % 12/12/2024 52.8  40.0 - 80.0 % Final    Immature Granulocytes %, Automated 12/12/2024 0.2  0.0 - 0.9 % Final    Immature Granulocyte Count (IG) includes promyelocytes, myelocytes and metamyelocytes but does not include bands. Percent differential counts (%) should be interpreted in the context of the absolute cell counts (cells/UL).    Lymphocytes % 12/12/2024 32.7  13.0 - 44.0 % Final    Monocytes % 12/12/2024 11.8  2.0 - 10.0 % Final    Eosinophils % 12/12/2024 2.3  0.0 - 6.0 % Final    Basophils % 12/12/2024 0.2  0.0 - 2.0 % Final    Neutrophils Absolute 12/12/2024 2.33  1.60 - 5.50 x10*3/uL Final    Percent differential counts (%) should be interpreted in the context of the absolute cell counts (cells/uL).    Immature Granulocytes Absolute, Au* 12/12/2024 0.01  0.00 - 0.50 x10*3/uL Final    Lymphocytes Absolute 12/12/2024 1.44  0.80 - 3.00 x10*3/uL Final    Monocytes Absolute 12/12/2024 0.52  0.05 - 0.80 x10*3/uL Final    Eosinophils Absolute 12/12/2024 0.10  0.00 - 0.40 x10*3/uL  Final    Basophils Absolute 12/12/2024 0.01  0.00 - 0.10 x10*3/uL Final    Glucose 12/12/2024 84  74 - 99 mg/dL Final    Sodium 12/12/2024 138  136 - 145 mmol/L Final    Potassium 12/12/2024 3.8  3.5 - 5.3 mmol/L Final    Chloride 12/12/2024 100  98 - 107 mmol/L Final    Bicarbonate 12/12/2024 27  21 - 32 mmol/L Final    Anion Gap 12/12/2024 15  10 - 20 mmol/L Final    Urea Nitrogen 12/12/2024 7  6 - 23 mg/dL Final    Creatinine 12/12/2024 0.83  0.50 - 1.05 mg/dL Final    eGFR 12/12/2024 75  >60 mL/min/1.73m*2 Final    Calculations of estimated GFR are performed using the 2021 CKD-EPI Study Refit equation without the race variable for the IDMS-Traceable creatinine methods.  https://jasn.asnjournals.org/content/early/2021/09/22/ASN.0200488764    Calcium 12/12/2024 9.1  8.6 - 10.6 mg/dL Final    Albumin 12/12/2024 3.9  3.4 - 5.0 g/dL Final    Alkaline Phosphatase 12/12/2024 88  33 - 136 U/L Final    Total Protein 12/12/2024 6.8  6.4 - 8.2 g/dL Final    AST 12/12/2024 51 (H)  9 - 39 U/L Final    Bilirubin, Total 12/12/2024 0.7  0.0 - 1.2 mg/dL Final    ALT 12/12/2024 68 (H)  7 - 45 U/L Final    Patients treated with Sulfasalazine may generate falsely decreased results for ALT.   Office Visit on 12/12/2024   Component Date Value Ref Range Status    Coronavirus 2019, PCR 12/12/2024 Not Detected  Not Detected Final      Physical Exam  Constitutional:       Appearance: Normal appearance.   HENT:      Right Ear: Tympanic membrane normal.      Left Ear: Tympanic membrane normal.      Nose: Congestion present.      Mouth/Throat:      Pharynx: Oropharyngeal exudate and posterior oropharyngeal erythema present.   Cardiovascular:      Rate and Rhythm: Normal rate and regular rhythm.   Pulmonary:      Effort: Pulmonary effort is normal.      Breath sounds: Normal breath sounds.   Abdominal:      General: Bowel sounds are normal.   Neurological:      General: No focal deficit present.      Mental Status: She is alert.    Psychiatric:         Mood and Affect: Mood normal.         Assessment/Plan   Problem List Items Addressed This Visit       Class 2 severe obesity with serious comorbidity in adult    Relevant Orders    CBC and Auto Differential (Completed)    Comprehensive Metabolic Panel (Completed)    Essential hypertension     Will recheck blood pressure is 118/78 patient will monitor closely at home call back if she has low numbers maintain water intake         Acute non-recurrent maxillary sinusitis - Primary     Acute sinusitis c/o headache fever facial pain cough ear pain cough green phlem. No ST .            Relevant Medications    cefuroxime (Ceftin) 500 mg tablet    Other Relevant Orders    Sars-CoV-2 PCR (Completed)    CBC and Auto Differential (Completed)    Annual physical exam

## 2024-12-16 ENCOUNTER — OFFICE VISIT (OUTPATIENT)
Dept: PRIMARY CARE | Facility: CLINIC | Age: 74
End: 2024-12-16
Payer: MEDICARE

## 2024-12-16 VITALS
SYSTOLIC BLOOD PRESSURE: 113 MMHG | HEART RATE: 81 BPM | HEIGHT: 60 IN | BODY MASS INDEX: 40.05 KG/M2 | WEIGHT: 204 LBS | DIASTOLIC BLOOD PRESSURE: 71 MMHG

## 2024-12-16 DIAGNOSIS — E78.5 DYSLIPIDEMIA: ICD-10-CM

## 2024-12-16 DIAGNOSIS — R10.11 COLICKY RUQ ABDOMINAL PAIN: ICD-10-CM

## 2024-12-16 DIAGNOSIS — I10 ESSENTIAL HYPERTENSION: Primary | ICD-10-CM

## 2024-12-16 PROCEDURE — G2211 COMPLEX E/M VISIT ADD ON: HCPCS | Performed by: FAMILY MEDICINE

## 2024-12-16 PROCEDURE — 3078F DIAST BP <80 MM HG: CPT | Performed by: FAMILY MEDICINE

## 2024-12-16 PROCEDURE — 1159F MED LIST DOCD IN RCRD: CPT | Performed by: FAMILY MEDICINE

## 2024-12-16 PROCEDURE — 3008F BODY MASS INDEX DOCD: CPT | Performed by: FAMILY MEDICINE

## 2024-12-16 PROCEDURE — 99214 OFFICE O/P EST MOD 30 MIN: CPT | Performed by: FAMILY MEDICINE

## 2024-12-16 PROCEDURE — 3074F SYST BP LT 130 MM HG: CPT | Performed by: FAMILY MEDICINE

## 2024-12-16 ASSESSMENT — ENCOUNTER SYMPTOMS
LOSS OF SENSATION IN FEET: 0
OCCASIONAL FEELINGS OF UNSTEADINESS: 0
DEPRESSION: 0

## 2024-12-17 ENCOUNTER — APPOINTMENT (OUTPATIENT)
Dept: RADIOLOGY | Facility: HOSPITAL | Age: 74
End: 2024-12-17
Payer: MEDICARE

## 2024-12-17 ENCOUNTER — HOSPITAL ENCOUNTER (EMERGENCY)
Facility: HOSPITAL | Age: 74
Discharge: HOME | End: 2024-12-17
Attending: EMERGENCY MEDICINE
Payer: MEDICARE

## 2024-12-17 ENCOUNTER — TELEPHONE (OUTPATIENT)
Dept: PRIMARY CARE | Facility: CLINIC | Age: 74
End: 2024-12-17
Payer: MEDICARE

## 2024-12-17 ENCOUNTER — APPOINTMENT (OUTPATIENT)
Dept: CARDIOLOGY | Facility: HOSPITAL | Age: 74
End: 2024-12-17
Payer: MEDICARE

## 2024-12-17 VITALS
RESPIRATION RATE: 13 BRPM | SYSTOLIC BLOOD PRESSURE: 168 MMHG | DIASTOLIC BLOOD PRESSURE: 80 MMHG | TEMPERATURE: 96.8 F | OXYGEN SATURATION: 100 % | HEART RATE: 80 BPM

## 2024-12-17 DIAGNOSIS — R42 LIGHTHEADEDNESS: Primary | ICD-10-CM

## 2024-12-17 DIAGNOSIS — E86.0 DEHYDRATION: ICD-10-CM

## 2024-12-17 LAB
ALBUMIN SERPL BCP-MCNC: 3.6 G/DL (ref 3.4–5)
ALP SERPL-CCNC: 95 U/L (ref 33–136)
ALT SERPL W P-5'-P-CCNC: 127 U/L (ref 7–45)
ANION GAP SERPL CALC-SCNC: 10 MMOL/L (ref 10–20)
APPEARANCE UR: CLEAR
AST SERPL W P-5'-P-CCNC: 290 U/L (ref 9–39)
BASOPHILS # BLD AUTO: 0.02 X10*3/UL (ref 0–0.1)
BASOPHILS NFR BLD AUTO: 0.2 %
BILIRUB SERPL-MCNC: 0.9 MG/DL (ref 0–1.2)
BILIRUB UR STRIP.AUTO-MCNC: NEGATIVE MG/DL
BUN SERPL-MCNC: 9 MG/DL (ref 6–23)
CALCIUM SERPL-MCNC: 8.5 MG/DL (ref 8.6–10.3)
CARDIAC TROPONIN I PNL SERPL HS: 4 NG/L (ref 0–13)
CHLORIDE SERPL-SCNC: 104 MMOL/L (ref 98–107)
CO2 SERPL-SCNC: 27 MMOL/L (ref 21–32)
COLOR UR: NORMAL
CREAT SERPL-MCNC: 0.81 MG/DL (ref 0.5–1.05)
EGFRCR SERPLBLD CKD-EPI 2021: 77 ML/MIN/1.73M*2
EOSINOPHIL # BLD AUTO: 0.12 X10*3/UL (ref 0–0.4)
EOSINOPHIL NFR BLD AUTO: 1.3 %
ERYTHROCYTE [DISTWIDTH] IN BLOOD BY AUTOMATED COUNT: 13.2 % (ref 11.5–14.5)
FLUAV RNA RESP QL NAA+PROBE: NOT DETECTED
FLUBV RNA RESP QL NAA+PROBE: NOT DETECTED
GLUCOSE SERPL-MCNC: 91 MG/DL (ref 74–99)
GLUCOSE UR STRIP.AUTO-MCNC: NORMAL MG/DL
HCT VFR BLD AUTO: 44.4 % (ref 36–46)
HGB BLD-MCNC: 14.3 G/DL (ref 12–16)
HOLD SPECIMEN: NORMAL
IMM GRANULOCYTES # BLD AUTO: 0.02 X10*3/UL (ref 0–0.5)
IMM GRANULOCYTES NFR BLD AUTO: 0.2 % (ref 0–0.9)
KETONES UR STRIP.AUTO-MCNC: NEGATIVE MG/DL
LEUKOCYTE ESTERASE UR QL STRIP.AUTO: NEGATIVE
LYMPHOCYTES # BLD AUTO: 1.15 X10*3/UL (ref 0.8–3)
LYMPHOCYTES NFR BLD AUTO: 12.5 %
MCH RBC QN AUTO: 30.2 PG (ref 26–34)
MCHC RBC AUTO-ENTMCNC: 32.2 G/DL (ref 32–36)
MCV RBC AUTO: 94 FL (ref 80–100)
MONOCYTES # BLD AUTO: 0.65 X10*3/UL (ref 0.05–0.8)
MONOCYTES NFR BLD AUTO: 7.1 %
NEUTROPHILS # BLD AUTO: 7.21 X10*3/UL (ref 1.6–5.5)
NEUTROPHILS NFR BLD AUTO: 78.7 %
NITRITE UR QL STRIP.AUTO: NEGATIVE
NRBC BLD-RTO: 0 /100 WBCS (ref 0–0)
PH UR STRIP.AUTO: 6 [PH]
PLATELET # BLD AUTO: 221 X10*3/UL (ref 150–450)
POTASSIUM SERPL-SCNC: 4.1 MMOL/L (ref 3.5–5.3)
PROT SERPL-MCNC: 7 G/DL (ref 6.4–8.2)
PROT UR STRIP.AUTO-MCNC: NEGATIVE MG/DL
RBC # BLD AUTO: 4.74 X10*6/UL (ref 4–5.2)
RBC # UR STRIP.AUTO: NEGATIVE /UL
RSV RNA RESP QL NAA+PROBE: NOT DETECTED
SARS-COV-2 RNA RESP QL NAA+PROBE: NOT DETECTED
SODIUM SERPL-SCNC: 137 MMOL/L (ref 136–145)
SP GR UR STRIP.AUTO: 1.01
UROBILINOGEN UR STRIP.AUTO-MCNC: NORMAL MG/DL
WBC # BLD AUTO: 9.2 X10*3/UL (ref 4.4–11.3)

## 2024-12-17 PROCEDURE — 36415 COLL VENOUS BLD VENIPUNCTURE: CPT | Performed by: PHYSICIAN ASSISTANT

## 2024-12-17 PROCEDURE — 96360 HYDRATION IV INFUSION INIT: CPT

## 2024-12-17 PROCEDURE — 71045 X-RAY EXAM CHEST 1 VIEW: CPT

## 2024-12-17 PROCEDURE — 85025 COMPLETE CBC W/AUTO DIFF WBC: CPT | Performed by: PHYSICIAN ASSISTANT

## 2024-12-17 PROCEDURE — 81003 URINALYSIS AUTO W/O SCOPE: CPT | Performed by: PHYSICIAN ASSISTANT

## 2024-12-17 PROCEDURE — 2500000004 HC RX 250 GENERAL PHARMACY W/ HCPCS (ALT 636 FOR OP/ED): Performed by: EMERGENCY MEDICINE

## 2024-12-17 PROCEDURE — 99285 EMERGENCY DEPT VISIT HI MDM: CPT | Performed by: EMERGENCY MEDICINE

## 2024-12-17 PROCEDURE — 93005 ELECTROCARDIOGRAM TRACING: CPT

## 2024-12-17 PROCEDURE — 84484 ASSAY OF TROPONIN QUANT: CPT | Performed by: PHYSICIAN ASSISTANT

## 2024-12-17 PROCEDURE — 71045 X-RAY EXAM CHEST 1 VIEW: CPT | Performed by: RADIOLOGY

## 2024-12-17 PROCEDURE — 80053 COMPREHEN METABOLIC PANEL: CPT | Performed by: EMERGENCY MEDICINE

## 2024-12-17 PROCEDURE — 87637 SARSCOV2&INF A&B&RSV AMP PRB: CPT | Performed by: PHYSICIAN ASSISTANT

## 2024-12-17 PROCEDURE — 96361 HYDRATE IV INFUSION ADD-ON: CPT

## 2024-12-17 ASSESSMENT — PAIN SCALES - GENERAL: PAINLEVEL_OUTOF10: 0 - NO PAIN

## 2024-12-17 ASSESSMENT — PAIN - FUNCTIONAL ASSESSMENT: PAIN_FUNCTIONAL_ASSESSMENT: 0-10

## 2024-12-17 NOTE — ED PROVIDER NOTES
HPI   No chief complaint on file.      74-year-old woman with past medical history of obesity, hypertension, who does present with complaint of lightheadedness while getting up this morning.  To get up to go to the bathroom and after getting out of the bathroom to feel lightheaded.  Did not have any syncopal event.  Denies any chest or abdominal pain.  Denies any infectious symptoms.  Denies any nausea vomiting diarrhea.  Denies any dysuria, hematuria or flank pain.  Denies any headache.  Denies any focal neurofindings.  Symptoms have resolved prior to arrival in the ED.            Patient History   Past Medical History:   Diagnosis Date    Abdominal bloating 2023    Abdominal pain 2023    Bilateral impacted cerumen 2023    Cough 2023    COVID-19 virus infection 2023    H. pylori infection 2023    Helicobacter positive gastritis 2023    Malignant neoplasm of body of uterus, unspecified site (Multi) 2023    Personal history of malignant neoplasm of other parts of uterus 2020    History of malignant neoplasm of other parts of uterus    Shoulder pain, right 2023    Uterine cancer (Multi) 2012     Past Surgical History:   Procedure Laterality Date    TOTAL ABDOMINAL HYSTERECTOMY W/ BILATERAL SALPINGOOPHORECTOMY  2016    Total Abdominal Hysterectomy With Removal Of Both Ovaries     Family History   Problem Relation Name Age of Onset    No Known Problems Mother      No Known Problems Father       Social History     Tobacco Use    Smoking status: Former     Current packs/day: 0.00     Types: Cigarettes     Quit date:      Years since quittin.9     Passive exposure: Never    Smokeless tobacco: Never   Substance Use Topics    Alcohol use: Yes    Drug use: Never       Physical Exam   ED Triage Vitals   Temperature Heart Rate Respirations BP   24 0503 24 0503 24 0503 24 0503   36 °C (96.8 °F) 76 15 121/68      Pulse Ox Temp  Source Heart Rate Source Patient Position   12/17/24 0503 12/17/24 0503 -- 12/17/24 0559   100 % Temporal  Lying      BP Location FiO2 (%)     -- --             Physical Exam  Vitals and nursing note reviewed.   Constitutional:       General: She is not in acute distress.     Appearance: Normal appearance. She is obese. She is not ill-appearing.   HENT:      Head: Normocephalic and atraumatic.      Nose: Nose normal.      Mouth/Throat:      Mouth: Mucous membranes are dry.      Pharynx: Oropharynx is clear.   Eyes:      Extraocular Movements: Extraocular movements intact.      Conjunctiva/sclera: Conjunctivae normal.      Pupils: Pupils are equal, round, and reactive to light.   Cardiovascular:      Rate and Rhythm: Normal rate and regular rhythm.      Pulses: Normal pulses.      Heart sounds: Normal heart sounds.   Pulmonary:      Effort: Pulmonary effort is normal.      Breath sounds: Normal breath sounds.   Abdominal:      General: Abdomen is flat. Bowel sounds are normal. There is no distension.      Palpations: Abdomen is soft.      Tenderness: There is no abdominal tenderness. There is no right CVA tenderness, left CVA tenderness, guarding or rebound.   Musculoskeletal:         General: No tenderness or deformity. Normal range of motion.      Cervical back: Normal range of motion and neck supple.   Skin:     General: Skin is warm and dry.      Capillary Refill: Capillary refill takes less than 2 seconds.   Neurological:      General: No focal deficit present.      Mental Status: She is alert and oriented to person, place, and time. Mental status is at baseline.      Sensory: No sensory deficit.      Motor: No weakness.   Psychiatric:         Mood and Affect: Mood normal.         Behavior: Behavior normal.         Thought Content: Thought content normal.         Judgment: Judgment normal.           ED Course & MDM   Diagnoses as of 01/01/25 0710   Lightheadedness   Dehydration                 No data recorded      [place] Coma Scale Score: 15 (12/17/24 0505 : Carlee Dang RN)                           Medical Decision Making  IV is placed and labs are drawn including CBC, CMP, troponin.  Troponin negative for ACS.  COVID and flu were negative.  RSV was negative.  Negative UTI.  There was no acute kidney injury.  No metabolic anion gap acidosis.  Did have slightly elevated LFTs and does not have any abdominal tenderness to palpation.  Tolerating p.o. normally.  Given patient's complaint patient did appear to be mildly dehydrated and does receive 1 L normal saline bolus here in the ED with significant improvement in symptoms.  Given patient's mildly elevated LFTs I will have patient follow-up with GI physician to have recheck for improvement    Amount and/or Complexity of Data Reviewed  Labs: ordered. Decision-making details documented in ED Course.  Radiology: ordered. Decision-making details documented in ED Course.  ECG/medicine tests: ordered. Decision-making details documented in ED Course.     Details: EKG interpreted by me shows normal sinus rhythm at a rate of 75 with no acute ischemic changes.  No STEMI.  No A-fib        Procedure  Procedures     Sivakumar Bales MD  12/17/24 1050       Sivakumar Bales MD  01/01/25 0710       Sivakumar Bales MD  01/24/25 0005

## 2024-12-17 NOTE — DISCHARGE INSTRUCTIONS
Please stay well-hydrated.  Please try to use probiotics to help with your diarrhea and improve digestion.  Please food intake.  Please MD for worsening pain, fever, vomiting or any other concerning symptoms.

## 2024-12-17 NOTE — ED TRIAGE NOTES
Pt BIBA from home. Per EMS patient ambulated to bathroom after taking her blood pressure medication. Pt felt like she was going to pass out. Pt made it back to bed without passing out. Took her BP which was 95/60. EMS BP was 140/80. . Pt denying dizziness or chest pain.

## 2024-12-18 LAB
ATRIAL RATE: 75 BPM
P AXIS: 75 DEGREES
P OFFSET: 200 MS
P ONSET: 146 MS
PR INTERVAL: 160 MS
Q ONSET: 226 MS
QRS COUNT: 12 BEATS
QRS DURATION: 68 MS
QT INTERVAL: 416 MS
QTC CALCULATION(BAZETT): 464 MS
QTC FREDERICIA: 447 MS
R AXIS: 30 DEGREES
T AXIS: 9 DEGREES
T OFFSET: 434 MS
VENTRICULAR RATE: 75 BPM

## 2024-12-22 ASSESSMENT — ENCOUNTER SYMPTOMS
MUSCULOSKELETAL NEGATIVE: 1
ALLERGIC/IMMUNOLOGIC NEGATIVE: 1
NERVOUS/ANXIOUS: 1
DIZZINESS: 1
ENDOCRINE NEGATIVE: 1
HEMATOLOGIC/LYMPHATIC NEGATIVE: 1
NAUSEA: 1
CARDIOVASCULAR NEGATIVE: 1
RESPIRATORY NEGATIVE: 1
VOMITING: 0
FEVER: 0
ABDOMINAL PAIN: 1
CHILLS: 0

## 2024-12-22 NOTE — PROGRESS NOTES
Subjective   Patient ID: Ana Lira is a 74 y.o. female who presents for Follow-up (Pain right side of stomach ).      HPI  74-year-old woman with past medical history of obesity, hypertension, who does present with complaint of lightheadedness  To get up to go to the bathroom and after getting out of the bathroom to feel lightheaded. Did not have any syncopal event. Denies any chest pain but has upper abdominal pain Denies any infectious symptoms. Denies any nausea vomiting diarrhea. Denies any dysuria, hematuria or flan ork pain. Denies any headache. Denies any focal neurofindings. Symptoms have resolved prior to arrival in the ED.   Current Outpatient Medications on File Prior to Visit   Medication Sig Dispense Refill    atorvastatin (Lipitor) 20 mg tablet Take 1 tablet daily  APOT TAB 90 tablet 3    dexlansoprazole (Dexilant) 60 mg DR capsule Take 1 capsule (60 mg) by mouth once daily. Do not crush or chew. 30 capsule 5    ergocalciferol (Vitamin D-2) 1.25 MG (66330 UT) capsule TAKE 1 CAPSULE Weekly 12 capsule 1    famotidine (Pepcid) 20 mg tablet Take 1 tablet (20 mg) by mouth 2 times a day for 15 days. 30 tablet 0    gabapentin (Neurontin) 100 mg capsule Take 1 capsule (100 mg) by mouth 3 times a day. 90 capsule 0    lansoprazole (Prevacid) 30 mg DR capsule Take 1 capsule (30 mg) by mouth once daily in the morning. Take before meals. 90 capsule 3    lisinopriL-hydrochlorothiazide 20-12.5 mg tablet Take 1 tablet by mouth once daily. 90 tablet 1    potassium chloride CR 20 mEq ER tablet Take 2 tablets (40 mEq) by mouth once daily. Do not crush or chew. 180 tablet 1    sucralfate (Carafate) 1 gram tablet Take 1 tablet (1 g) by mouth 4 times a day before meals. 120 tablet 3    [DISCONTINUED] benzonatate (Tessalon) 200 mg capsule Take 1 capsule (200 mg) by mouth 3 times a day as needed for cough. Do not crush or chew. 42 capsule 0    [DISCONTINUED] cefuroxime (Ceftin) 500 mg tablet Take 1 tablet (500 mg) by  mouth 2 times a day for 7 days. 14 tablet 0     No current facility-administered medications on file prior to visit.        Review of Systems   Constitutional:  Negative for chills and fever.   HENT: Negative.     Respiratory: Negative.     Cardiovascular: Negative.    Gastrointestinal:  Positive for abdominal pain and nausea. Negative for vomiting.   Endocrine: Negative.    Genitourinary: Negative.    Musculoskeletal: Negative.    Skin: Negative.  Negative for rash.   Allergic/Immunologic: Negative.    Neurological:  Positive for dizziness.   Hematological: Negative.    Psychiatric/Behavioral:  The patient is nervous/anxious.    All other systems reviewed and are negative.      Objective   /71   Pulse 81   Ht 1.524 m (5')   Wt 92.5 kg (204 lb)   BMI 39.84 kg/m²   BSA: 1.98 meters squared  Growth percentiles: Facility age limit for growth %tl is 20 years. Facility age limit for growth %tl is 20 years.   Lab on 12/12/2024   Component Date Value Ref Range Status    WBC 12/12/2024 4.4  4.4 - 11.3 x10*3/uL Final    nRBC 12/12/2024 0.0  0.0 - 0.0 /100 WBCs Final    RBC 12/12/2024 4.83  4.00 - 5.20 x10*6/uL Final    Hemoglobin 12/12/2024 14.7  12.0 - 16.0 g/dL Final    Hematocrit 12/12/2024 44.6  36.0 - 46.0 % Final    MCV 12/12/2024 92  80 - 100 fL Final    MCH 12/12/2024 30.4  26.0 - 34.0 pg Final    MCHC 12/12/2024 33.0  32.0 - 36.0 g/dL Final    RDW 12/12/2024 12.9  11.5 - 14.5 % Final    Platelets 12/12/2024 226  150 - 450 x10*3/uL Final    Neutrophils % 12/12/2024 52.8  40.0 - 80.0 % Final    Immature Granulocytes %, Automated 12/12/2024 0.2  0.0 - 0.9 % Final    Immature Granulocyte Count (IG) includes promyelocytes, myelocytes and metamyelocytes but does not include bands. Percent differential counts (%) should be interpreted in the context of the absolute cell counts (cells/UL).    Lymphocytes % 12/12/2024 32.7  13.0 - 44.0 % Final    Monocytes % 12/12/2024 11.8  2.0 - 10.0 % Final    Eosinophils %  12/12/2024 2.3  0.0 - 6.0 % Final    Basophils % 12/12/2024 0.2  0.0 - 2.0 % Final    Neutrophils Absolute 12/12/2024 2.33  1.60 - 5.50 x10*3/uL Final    Percent differential counts (%) should be interpreted in the context of the absolute cell counts (cells/uL).    Immature Granulocytes Absolute, Au* 12/12/2024 0.01  0.00 - 0.50 x10*3/uL Final    Lymphocytes Absolute 12/12/2024 1.44  0.80 - 3.00 x10*3/uL Final    Monocytes Absolute 12/12/2024 0.52  0.05 - 0.80 x10*3/uL Final    Eosinophils Absolute 12/12/2024 0.10  0.00 - 0.40 x10*3/uL Final    Basophils Absolute 12/12/2024 0.01  0.00 - 0.10 x10*3/uL Final    Glucose 12/12/2024 84  74 - 99 mg/dL Final    Sodium 12/12/2024 138  136 - 145 mmol/L Final    Potassium 12/12/2024 3.8  3.5 - 5.3 mmol/L Final    Chloride 12/12/2024 100  98 - 107 mmol/L Final    Bicarbonate 12/12/2024 27  21 - 32 mmol/L Final    Anion Gap 12/12/2024 15  10 - 20 mmol/L Final    Urea Nitrogen 12/12/2024 7  6 - 23 mg/dL Final    Creatinine 12/12/2024 0.83  0.50 - 1.05 mg/dL Final    eGFR 12/12/2024 75  >60 mL/min/1.73m*2 Final    Calculations of estimated GFR are performed using the 2021 CKD-EPI Study Refit equation without the race variable for the IDMS-Traceable creatinine methods.  https://jasn.asnjournals.org/content/early/2021/09/22/ASN.6398274726    Calcium 12/12/2024 9.1  8.6 - 10.6 mg/dL Final    Albumin 12/12/2024 3.9  3.4 - 5.0 g/dL Final    Alkaline Phosphatase 12/12/2024 88  33 - 136 U/L Final    Total Protein 12/12/2024 6.8  6.4 - 8.2 g/dL Final    AST 12/12/2024 51 (H)  9 - 39 U/L Final    Bilirubin, Total 12/12/2024 0.7  0.0 - 1.2 mg/dL Final    ALT 12/12/2024 68 (H)  7 - 45 U/L Final    Patients treated with Sulfasalazine may generate falsely decreased results for ALT.   Office Visit on 12/12/2024   Component Date Value Ref Range Status    Coronavirus 2019, PCR 12/12/2024 Not Detected  Not Detected Final      Physical Exam  Constitutional:       Appearance: Normal appearance.    Cardiovascular:      Rate and Rhythm: Normal rate and regular rhythm.   Pulmonary:      Effort: Pulmonary effort is normal.      Breath sounds: Normal breath sounds.   Abdominal:      Tenderness: There is abdominal tenderness.   Neurological:      General: No focal deficit present.      Mental Status: She is alert.   Psychiatric:         Mood and Affect: Mood normal.         Assessment/Plan   Problem List Items Addressed This Visit       Colicky RUQ abdominal pain     Will check CMP CBC patient was encouraged to stay hydrated         Dyslipidemia     Check lipid panel continue medications continue healthy eating work out  150 minutes a week           Essential hypertension - Primary     Will recheck blood pressure is 118/78 patient will monitor closely at home call back if she has low numbers maintain water intake

## 2024-12-30 ENCOUNTER — APPOINTMENT (OUTPATIENT)
Dept: PRIMARY CARE | Facility: CLINIC | Age: 74
End: 2024-12-30
Payer: MEDICARE

## 2024-12-30 ENCOUNTER — LAB (OUTPATIENT)
Dept: LAB | Facility: LAB | Age: 74
End: 2024-12-30
Payer: MEDICARE

## 2024-12-30 VITALS — BODY MASS INDEX: 39.5 KG/M2 | HEIGHT: 60 IN | WEIGHT: 201.2 LBS

## 2024-12-30 DIAGNOSIS — R79.89 ELEVATED LFTS: Primary | ICD-10-CM

## 2024-12-30 DIAGNOSIS — R74.8 ABNORMAL LEVELS OF OTHER SERUM ENZYMES: ICD-10-CM

## 2024-12-30 DIAGNOSIS — R79.89 ELEVATED LFTS: ICD-10-CM

## 2024-12-30 DIAGNOSIS — R10.11 COLICKY RUQ ABDOMINAL PAIN: ICD-10-CM

## 2024-12-30 DIAGNOSIS — I10 ESSENTIAL HYPERTENSION: ICD-10-CM

## 2024-12-30 LAB
ALBUMIN SERPL BCP-MCNC: 4.1 G/DL (ref 3.4–5)
ALP SERPL-CCNC: 75 U/L (ref 33–136)
ALT SERPL W P-5'-P-CCNC: 23 U/L (ref 7–45)
AST SERPL W P-5'-P-CCNC: 29 U/L (ref 9–39)
BILIRUB DIRECT SERPL-MCNC: 0.1 MG/DL (ref 0–0.3)
BILIRUB SERPL-MCNC: 1 MG/DL (ref 0–1.2)
GGT SERPL-CCNC: 49 U/L (ref 5–55)
PROT SERPL-MCNC: 6.9 G/DL (ref 6.4–8.2)

## 2024-12-30 PROCEDURE — G2211 COMPLEX E/M VISIT ADD ON: HCPCS | Performed by: FAMILY MEDICINE

## 2024-12-30 PROCEDURE — 82977 ASSAY OF GGT: CPT

## 2024-12-30 PROCEDURE — 1159F MED LIST DOCD IN RCRD: CPT | Performed by: FAMILY MEDICINE

## 2024-12-30 PROCEDURE — 99214 OFFICE O/P EST MOD 30 MIN: CPT | Performed by: FAMILY MEDICINE

## 2024-12-30 PROCEDURE — 3008F BODY MASS INDEX DOCD: CPT | Performed by: FAMILY MEDICINE

## 2024-12-30 PROCEDURE — 80076 HEPATIC FUNCTION PANEL: CPT

## 2024-12-30 ASSESSMENT — ENCOUNTER SYMPTOMS
OCCASIONAL FEELINGS OF UNSTEADINESS: 0
LOSS OF SENSATION IN FEET: 0
DEPRESSION: 0

## 2024-12-30 ASSESSMENT — PATIENT HEALTH QUESTIONNAIRE - PHQ9
SUM OF ALL RESPONSES TO PHQ9 QUESTIONS 1 AND 2: 0
2. FEELING DOWN, DEPRESSED OR HOPELESS: NOT AT ALL
1. LITTLE INTEREST OR PLEASURE IN DOING THINGS: NOT AT ALL

## 2024-12-31 ENCOUNTER — HOSPITAL ENCOUNTER (OUTPATIENT)
Dept: RADIOLOGY | Facility: CLINIC | Age: 74
Discharge: HOME | End: 2024-12-31
Payer: MEDICARE

## 2024-12-31 DIAGNOSIS — R79.89 ELEVATED LFTS: ICD-10-CM

## 2024-12-31 PROCEDURE — 76705 ECHO EXAM OF ABDOMEN: CPT | Performed by: RADIOLOGY

## 2024-12-31 PROCEDURE — 76705 ECHO EXAM OF ABDOMEN: CPT

## 2025-01-02 PROBLEM — R74.8 ABNORMAL LEVELS OF OTHER SERUM ENZYMES: Status: ACTIVE | Noted: 2025-01-02

## 2025-01-02 PROBLEM — R79.89 ELEVATED LFTS: Status: ACTIVE | Noted: 2025-01-02

## 2025-01-02 ASSESSMENT — ENCOUNTER SYMPTOMS
HEMATOLOGIC/LYMPHATIC NEGATIVE: 1
CHILLS: 0
ALLERGIC/IMMUNOLOGIC NEGATIVE: 1
NAUSEA: 1
FEVER: 0
RESPIRATORY NEGATIVE: 1
VOMITING: 0
NERVOUS/ANXIOUS: 1
CARDIOVASCULAR NEGATIVE: 1
MUSCULOSKELETAL NEGATIVE: 1
ABDOMINAL PAIN: 1
ENDOCRINE NEGATIVE: 1
DIZZINESS: 1

## 2025-01-02 NOTE — PROGRESS NOTES
Subjective   Patient ID: Ana Lira is a 74 y.o. female who presents for Follow-up (Follow up from ER ).      HPI  74-year-old woman with past medical history of obesity, hypertension, who does present with complaint of lightheadedness  To get up to go to the bathroom and after getting out of the bathroom to feel lightheaded. Did not have any syncopal event. Denies any chest pain but has upper abdominal pain Denies any infectious symptoms. Denies any nausea vomiting diarrhea. Denies any dysuria, hematuria or flan ork pain. Denies any headache. Denies any focal neurofindings. Symptoms have resolved prior to arrival in the ED.   Current Outpatient Medications on File Prior to Visit   Medication Sig Dispense Refill    atorvastatin (Lipitor) 20 mg tablet Take 1 tablet daily  APOT TAB 90 tablet 3    dexlansoprazole (Dexilant) 60 mg DR capsule Take 1 capsule (60 mg) by mouth once daily. Do not crush or chew. 30 capsule 5    ergocalciferol (Vitamin D-2) 1.25 MG (10271 UT) capsule TAKE 1 CAPSULE Weekly 12 capsule 1    lansoprazole (Prevacid) 30 mg DR capsule Take 1 capsule (30 mg) by mouth once daily in the morning. Take before meals. 90 capsule 3    lisinopriL-hydrochlorothiazide 20-12.5 mg tablet Take 1 tablet by mouth once daily. 90 tablet 1    potassium chloride CR 20 mEq ER tablet Take 2 tablets (40 mEq) by mouth once daily. Do not crush or chew. 180 tablet 1    sucralfate (Carafate) 1 gram tablet Take 1 tablet (1 g) by mouth 4 times a day before meals. 120 tablet 3    famotidine (Pepcid) 20 mg tablet Take 1 tablet (20 mg) by mouth 2 times a day for 15 days. 30 tablet 0    gabapentin (Neurontin) 100 mg capsule Take 1 capsule (100 mg) by mouth 3 times a day. 90 capsule 0     No current facility-administered medications on file prior to visit.        Review of Systems   Constitutional:  Negative for chills and fever.   HENT: Negative.     Respiratory: Negative.     Cardiovascular: Negative.    Gastrointestinal:   Positive for abdominal pain and nausea. Negative for vomiting.   Endocrine: Negative.    Genitourinary: Negative.    Musculoskeletal: Negative.    Skin: Negative.  Negative for rash.   Allergic/Immunologic: Negative.    Neurological:  Positive for dizziness.   Hematological: Negative.    Psychiatric/Behavioral:  The patient is nervous/anxious.    All other systems reviewed and are negative.      Objective   Ht 1.524 m (5')   Wt 91.3 kg (201 lb 3.2 oz)   BMI 39.29 kg/m²   BSA: 1.97 meters squared  Growth percentiles: Facility age limit for growth %tl is 20 years. Facility age limit for growth %tl is 20 years.   Lab on 12/30/2024   Component Date Value Ref Range Status    Albumin 12/30/2024 4.1  3.4 - 5.0 g/dL Final    Bilirubin, Total 12/30/2024 1.0  0.0 - 1.2 mg/dL Final    Bilirubin, Direct 12/30/2024 0.1  0.0 - 0.3 mg/dL Final    MILD HEMOLYSIS DETECTED. The result may be falsely decreased due to hemolysis or other interferents. Clinical correlation is recommended. Repeat testing may be considered.    Alkaline Phosphatase 12/30/2024 75  33 - 136 U/L Final    ALT 12/30/2024 23  7 - 45 U/L Final    Patients treated with Sulfasalazine may generate falsely decreased results for ALT.    AST 12/30/2024 29  9 - 39 U/L Final    MILD HEMOLYSIS DETECTED. The result may be falsely elevated due to hemolysis or other interferents. Clinical correlation is recommended. Repeat testing may be considered.    Total Protein 12/30/2024 6.9  6.4 - 8.2 g/dL Final    GGT 12/30/2024 49  5 - 55 U/L Final      Physical Exam  Constitutional:       Appearance: Normal appearance.   Cardiovascular:      Rate and Rhythm: Normal rate and regular rhythm.   Pulmonary:      Effort: Pulmonary effort is normal.      Breath sounds: Normal breath sounds.   Abdominal:      Tenderness: There is abdominal tenderness.   Neurological:      General: No focal deficit present.      Mental Status: She is alert.   Psychiatric:         Mood and Affect: Mood  normal.         Assessment/Plan   Problem List Items Addressed This Visit       Colicky RUQ abdominal pain     Will check CMP CBC patient was encouraged to stay hydrated         Essential hypertension     Will recheck blood pressure is 118/78 patient will monitor closely at home call back if she has low numbers maintain water intake         Abnormal levels of other serum enzymes    Relevant Orders    Gamma GT (Completed)    Elevated LFTs - Primary    Relevant Orders    Hepatic function panel (Completed)    Gamma GT (Completed)    US abdomen limited liver (Completed)

## 2025-01-23 ENCOUNTER — OFFICE VISIT (OUTPATIENT)
Dept: URGENT CARE | Age: 75
End: 2025-01-23
Payer: MEDICARE

## 2025-01-23 VITALS
HEART RATE: 79 BPM | OXYGEN SATURATION: 97 % | DIASTOLIC BLOOD PRESSURE: 79 MMHG | SYSTOLIC BLOOD PRESSURE: 127 MMHG | TEMPERATURE: 98.3 F | RESPIRATION RATE: 16 BRPM

## 2025-01-23 DIAGNOSIS — R10.13 EPIGASTRIC PAIN: ICD-10-CM

## 2025-01-23 DIAGNOSIS — R10.811 RIGHT UPPER QUADRANT ABDOMINAL TENDERNESS WITHOUT REBOUND TENDERNESS: Primary | ICD-10-CM

## 2025-01-23 LAB
POC APPEARANCE, URINE: CLEAR
POC BILIRUBIN, URINE: NEGATIVE
POC BLOOD, URINE: NEGATIVE
POC COLOR, URINE: YELLOW
POC GLUCOSE, URINE: NEGATIVE MG/DL
POC KETONES, URINE: NEGATIVE MG/DL
POC LEUKOCYTES, URINE: NEGATIVE
POC NITRITE,URINE: NEGATIVE
POC PH, URINE: 6 PH
POC PROTEIN, URINE: NEGATIVE MG/DL
POC SPECIFIC GRAVITY, URINE: 1.01
POC UROBILINOGEN, URINE: 0.2 EU/DL

## 2025-01-23 RX ORDER — LIDOCAINE HYDROCHLORIDE 20 MG/ML
5 SOLUTION OROPHARYNGEAL ONCE
Status: COMPLETED | OUTPATIENT
Start: 2025-01-23 | End: 2025-01-23

## 2025-01-23 RX ORDER — ALUMINUM HYDROXIDE, MAGNESIUM HYDROXIDE, AND SIMETHICONE 1200; 120; 1200 MG/30ML; MG/30ML; MG/30ML
20 SUSPENSION ORAL ONCE
Status: COMPLETED | OUTPATIENT
Start: 2025-01-23 | End: 2025-01-23

## 2025-01-23 RX ADMIN — ALUMINUM HYDROXIDE, MAGNESIUM HYDROXIDE, AND SIMETHICONE 20 ML: 1200; 120; 1200 SUSPENSION ORAL at 18:20

## 2025-01-23 RX ADMIN — LIDOCAINE HYDROCHLORIDE 5 ML: 20 SOLUTION OROPHARYNGEAL at 18:17

## 2025-01-23 ASSESSMENT — ENCOUNTER SYMPTOMS: ABDOMINAL PAIN: 1

## 2025-01-23 NOTE — PROGRESS NOTES
Subjective   Patient ID: Ana Lira is a 75 y.o. female. They present today with a chief complaint of GERD and Abdominal Pain (Pt is having acid reflux and a pain down her left side going down her leg. No injury).    History of Present Illness    GERD  Associated symptoms: abdominal pain    Abdominal Pain  Her past medical history is significant for GERD.       Past Medical History  Allergies as of 01/23/2025 - Reviewed 01/23/2025   Allergen Reaction Noted    Cefuroxime Nausea/vomiting 12/30/2024    Codeine Other 02/05/2023    Oxycodone-acetaminophen Other 02/05/2023       (Not in a hospital admission)       Past Medical History:   Diagnosis Date    Abdominal bloating 02/05/2023    Abdominal pain 02/05/2023    Bilateral impacted cerumen 02/05/2023    Cough 02/05/2023    COVID-19 virus infection 02/05/2023    H. pylori infection 02/05/2023    Helicobacter positive gastritis 02/05/2023    Malignant neoplasm of body of uterus, unspecified site (Multi) 04/13/2023    Personal history of malignant neoplasm of other parts of uterus 07/25/2020    History of malignant neoplasm of other parts of uterus    Shoulder pain, right 02/05/2023    Uterine cancer (Multi) 08/16/2012       Past Surgical History:   Procedure Laterality Date    TOTAL ABDOMINAL HYSTERECTOMY W/ BILATERAL SALPINGOOPHORECTOMY  04/25/2016    Total Abdominal Hysterectomy With Removal Of Both Ovaries        reports that she quit smoking about 22 years ago. Her smoking use included cigarettes. She has never been exposed to tobacco smoke. She has never used smokeless tobacco. She reports current alcohol use. She reports that she does not use drugs.    Review of Systems  Review of Systems   Gastrointestinal:  Positive for abdominal pain.                                  Objective    Vitals:    01/23/25 1704   BP: 127/79   BP Location: Left arm   Patient Position: Sitting   Pulse: 79   Resp: 16   Temp: 36.8 °C (98.3 °F)   SpO2: 97%     No LMP recorded. Patient  has had a hysterectomy.    Physical Exam  Constitutional:       Appearance: Normal appearance.   Cardiovascular:      Rate and Rhythm: Normal rate and regular rhythm.   Pulmonary:      Effort: Pulmonary effort is normal.      Breath sounds: Normal breath sounds.   Abdominal:      General: A surgical scar is present. Bowel sounds are increased.      Tenderness: There is abdominal tenderness in the right upper quadrant and epigastric area.   Neurological:      Mental Status: She is alert.         Procedures    Point of Care Test & Imaging Results from this visit  No results found for this visit on 01/23/25.   No results found.    Diagnostic study results (if any) were reviewed by Alondra Andujar MD.    Assessment/Plan   Allergies, medications, history, and pertinent labs/EKGs/Imaging reviewed by Alondra Andujar MD.     Medical Decision Making      Orders and Diagnoses  There are no diagnoses linked to this encounter.    Medical Admin Record      Patient disposition: Home    Electronically signed by Alondra Andujar MD  5:55 PM

## 2025-01-29 ENCOUNTER — OFFICE VISIT (OUTPATIENT)
Dept: PRIMARY CARE | Facility: CLINIC | Age: 75
End: 2025-01-29
Payer: MEDICARE

## 2025-01-29 VITALS
SYSTOLIC BLOOD PRESSURE: 107 MMHG | BODY MASS INDEX: 39.03 KG/M2 | WEIGHT: 198.8 LBS | HEART RATE: 82 BPM | HEIGHT: 60 IN | DIASTOLIC BLOOD PRESSURE: 72 MMHG

## 2025-01-29 DIAGNOSIS — M48.062 SPINAL STENOSIS OF LUMBAR REGION WITH NEUROGENIC CLAUDICATION: Primary | ICD-10-CM

## 2025-01-29 DIAGNOSIS — E66.01 CLASS 2 SEVERE OBESITY WITH SERIOUS COMORBIDITY AND BODY MASS INDEX (BMI) OF 39.0 TO 39.9 IN ADULT, UNSPECIFIED OBESITY TYPE: ICD-10-CM

## 2025-01-29 DIAGNOSIS — N18.31 STAGE 3A CHRONIC KIDNEY DISEASE (MULTI): ICD-10-CM

## 2025-01-29 DIAGNOSIS — E66.812 CLASS 2 SEVERE OBESITY WITH SERIOUS COMORBIDITY AND BODY MASS INDEX (BMI) OF 39.0 TO 39.9 IN ADULT, UNSPECIFIED OBESITY TYPE: ICD-10-CM

## 2025-01-29 DIAGNOSIS — D32.9 MENINGIOMA (MULTI): ICD-10-CM

## 2025-01-29 PROCEDURE — 99214 OFFICE O/P EST MOD 30 MIN: CPT | Performed by: FAMILY MEDICINE

## 2025-01-29 PROCEDURE — G2211 COMPLEX E/M VISIT ADD ON: HCPCS | Performed by: FAMILY MEDICINE

## 2025-01-29 PROCEDURE — 3078F DIAST BP <80 MM HG: CPT | Performed by: FAMILY MEDICINE

## 2025-01-29 PROCEDURE — 1159F MED LIST DOCD IN RCRD: CPT | Performed by: FAMILY MEDICINE

## 2025-01-29 PROCEDURE — 3074F SYST BP LT 130 MM HG: CPT | Performed by: FAMILY MEDICINE

## 2025-01-29 PROCEDURE — 3008F BODY MASS INDEX DOCD: CPT | Performed by: FAMILY MEDICINE

## 2025-01-29 RX ORDER — METHOCARBAMOL 750 MG/1
750 TABLET, FILM COATED ORAL 3 TIMES DAILY
Qty: 45 TABLET | Refills: 0 | Status: SHIPPED | OUTPATIENT
Start: 2025-01-29 | End: 2025-02-13

## 2025-01-29 RX ORDER — PREDNISONE 20 MG/1
20 TABLET ORAL 2 TIMES DAILY
Qty: 14 TABLET | Refills: 0 | Status: SHIPPED | OUTPATIENT
Start: 2025-01-29 | End: 2025-02-05

## 2025-01-29 ASSESSMENT — ENCOUNTER SYMPTOMS
LOSS OF SENSATION IN FEET: 0
DEPRESSION: 0
OCCASIONAL FEELINGS OF UNSTEADINESS: 0

## 2025-02-01 PROBLEM — M48.062 SPINAL STENOSIS OF LUMBAR REGION WITH NEUROGENIC CLAUDICATION: Status: ACTIVE | Noted: 2025-02-01

## 2025-02-01 ASSESSMENT — ENCOUNTER SYMPTOMS
MYALGIAS: 1
VOMITING: 0
ENDOCRINE NEGATIVE: 1
BACK PAIN: 1
NAUSEA: 0
CARDIOVASCULAR NEGATIVE: 1
ALLERGIC/IMMUNOLOGIC NEGATIVE: 1
FEVER: 0
ABDOMINAL PAIN: 1
CHILLS: 0
NERVOUS/ANXIOUS: 1
RESPIRATORY NEGATIVE: 1
HEMATOLOGIC/LYMPHATIC NEGATIVE: 1

## 2025-02-01 NOTE — PROGRESS NOTES
Subjective   Patient ID: Ana Lira is a 75 y.o. female who presents for Follow-up (Pain on right side ) and Abdominal Pain (Right side pain ).      Abdominal Pain  Associated symptoms include myalgias. Pertinent negatives include no fever, nausea or vomiting.     Right groin pain and back pain h/o stenosis radiating down riht thigh was doing great with PT  Hip X ray last year normal   Has DDD   Current Outpatient Medications on File Prior to Visit   Medication Sig Dispense Refill    atorvastatin (Lipitor) 20 mg tablet Take 1 tablet daily  APOT TAB 90 tablet 3    dexlansoprazole (Dexilant) 60 mg DR capsule Take 1 capsule (60 mg) by mouth once daily. Do not crush or chew. 30 capsule 5    ergocalciferol (Vitamin D-2) 1.25 MG (40684 UT) capsule TAKE 1 CAPSULE Weekly 12 capsule 1    famotidine (Pepcid) 20 mg tablet Take 1 tablet (20 mg) by mouth 2 times a day for 15 days. 30 tablet 0    gabapentin (Neurontin) 100 mg capsule Take 1 capsule (100 mg) by mouth 3 times a day. 90 capsule 0    lansoprazole (Prevacid) 30 mg DR capsule Take 1 capsule (30 mg) by mouth once daily in the morning. Take before meals. 90 capsule 3    lisinopriL-hydrochlorothiazide 20-12.5 mg tablet Take 1 tablet by mouth once daily. 90 tablet 1    potassium chloride CR 20 mEq ER tablet Take 2 tablets (40 mEq) by mouth once daily. Do not crush or chew. 180 tablet 1    sucralfate (Carafate) 1 gram tablet Take 1 tablet (1 g) by mouth 4 times a day before meals. 120 tablet 3     No current facility-administered medications on file prior to visit.        Review of Systems   Constitutional:  Negative for chills and fever.   HENT: Negative.     Respiratory: Negative.     Cardiovascular: Negative.    Gastrointestinal:  Positive for abdominal pain. Negative for nausea and vomiting.   Endocrine: Negative.    Genitourinary: Negative.    Musculoskeletal:  Positive for back pain, gait problem and myalgias.   Skin: Negative.  Negative for rash.    Allergic/Immunologic: Negative.    Hematological: Negative.    Psychiatric/Behavioral:  The patient is nervous/anxious.    All other systems reviewed and are negative.      Objective   /72   Pulse 82   Ht 1.524 m (5')   Wt 90.2 kg (198 lb 12.8 oz)   BMI 38.83 kg/m²   BSA: 1.95 meters squared  Growth percentiles: Facility age limit for growth %tl is 20 years. Facility age limit for growth %tl is 20 years.   Office Visit on 01/23/2025   Component Date Value Ref Range Status    POC Color, Urine 01/23/2025 Yellow  Straw, Yellow, Light-Yellow Final    POC Appearance, Urine 01/23/2025 Clear  Clear Final    POC Glucose, Urine 01/23/2025 NEGATIVE  NEGATIVE mg/dl Final    POC Bilirubin, Urine 01/23/2025 NEGATIVE  NEGATIVE Final    POC Ketones, Urine 01/23/2025 NEGATIVE  NEGATIVE mg/dl Final    POC Specific Gravity, Urine 01/23/2025 1.010  1.005 - 1.035 Final    POC Blood, Urine 01/23/2025 NEGATIVE  NEGATIVE Final    POC PH, Urine 01/23/2025 6.0  No Reference Range Established PH Final    POC Protein, Urine 01/23/2025 NEGATIVE  NEGATIVE mg/dl Final    POC Urobilinogen, Urine 01/23/2025 0.2  0.2, 1.0 EU/DL Final    Poc Nitrite, Urine 01/23/2025 NEGATIVE  NEGATIVE Final    POC Leukocytes, Urine 01/23/2025 NEGATIVE  NEGATIVE Final      Physical Exam  Constitutional:       Appearance: Normal appearance.   Cardiovascular:      Rate and Rhythm: Normal rate and regular rhythm.   Pulmonary:      Effort: Pulmonary effort is normal.      Breath sounds: Normal breath sounds.   Abdominal:      General: Bowel sounds are normal.   Musculoskeletal:         General: Tenderness present.      Comments: Right lower back tenderness SLR positive    Neurological:      General: No focal deficit present.      Mental Status: She is alert.   Psychiatric:         Mood and Affect: Mood normal.         Assessment/Plan   Problem List Items Addressed This Visit       CKD (chronic kidney disease), stage III (Multi)     ,[id number]            Class 2 severe obesity with serious comorbidity in adult    Meningioma (Multi)     UTD MRI         Spinal stenosis of lumbar region with neurogenic claudication - Primary    Relevant Medications    predniSONE (Deltasone) 20 mg tablet    methocarbamol (Robaxin) 750 mg tablet    Other Relevant Orders    Referral to Physical Therapy

## 2025-02-03 ENCOUNTER — TELEPHONE (OUTPATIENT)
Dept: PRIMARY CARE | Facility: CLINIC | Age: 75
End: 2025-02-03
Payer: MEDICARE

## 2025-02-28 ENCOUNTER — APPOINTMENT (OUTPATIENT)
Dept: PRIMARY CARE | Facility: CLINIC | Age: 75
End: 2025-02-28
Payer: MEDICARE

## 2025-02-28 VITALS
BODY MASS INDEX: 39.46 KG/M2 | HEIGHT: 60 IN | WEIGHT: 201 LBS | DIASTOLIC BLOOD PRESSURE: 73 MMHG | HEART RATE: 99 BPM | SYSTOLIC BLOOD PRESSURE: 112 MMHG

## 2025-02-28 DIAGNOSIS — N18.31 STAGE 3A CHRONIC KIDNEY DISEASE (MULTI): ICD-10-CM

## 2025-02-28 DIAGNOSIS — E55.9 VITAMIN D DEFICIENCY: ICD-10-CM

## 2025-02-28 DIAGNOSIS — I10 PRIMARY HYPERTENSION: Primary | ICD-10-CM

## 2025-02-28 DIAGNOSIS — E66.01 CLASS 2 SEVERE OBESITY WITH SERIOUS COMORBIDITY AND BODY MASS INDEX (BMI) OF 39.0 TO 39.9 IN ADULT, UNSPECIFIED OBESITY TYPE: ICD-10-CM

## 2025-02-28 DIAGNOSIS — R79.89 ELEVATED LFTS: ICD-10-CM

## 2025-02-28 DIAGNOSIS — D32.9 MENINGIOMA (MULTI): ICD-10-CM

## 2025-02-28 DIAGNOSIS — E78.5 DYSLIPIDEMIA: ICD-10-CM

## 2025-02-28 DIAGNOSIS — Z00.00 ROUTINE GENERAL MEDICAL EXAMINATION AT HEALTH CARE FACILITY: ICD-10-CM

## 2025-02-28 DIAGNOSIS — M48.062 SPINAL STENOSIS OF LUMBAR REGION WITH NEUROGENIC CLAUDICATION: ICD-10-CM

## 2025-02-28 DIAGNOSIS — E66.812 CLASS 2 SEVERE OBESITY WITH SERIOUS COMORBIDITY AND BODY MASS INDEX (BMI) OF 39.0 TO 39.9 IN ADULT, UNSPECIFIED OBESITY TYPE: ICD-10-CM

## 2025-02-28 DIAGNOSIS — E87.6 HYPOKALEMIA: ICD-10-CM

## 2025-02-28 PROCEDURE — 3008F BODY MASS INDEX DOCD: CPT | Performed by: FAMILY MEDICINE

## 2025-02-28 PROCEDURE — 3074F SYST BP LT 130 MM HG: CPT | Performed by: FAMILY MEDICINE

## 2025-02-28 PROCEDURE — 1036F TOBACCO NON-USER: CPT | Performed by: FAMILY MEDICINE

## 2025-02-28 PROCEDURE — 99214 OFFICE O/P EST MOD 30 MIN: CPT | Performed by: FAMILY MEDICINE

## 2025-02-28 PROCEDURE — G0439 PPPS, SUBSEQ VISIT: HCPCS | Performed by: FAMILY MEDICINE

## 2025-02-28 PROCEDURE — 1170F FXNL STATUS ASSESSED: CPT | Performed by: FAMILY MEDICINE

## 2025-02-28 PROCEDURE — 1159F MED LIST DOCD IN RCRD: CPT | Performed by: FAMILY MEDICINE

## 2025-02-28 PROCEDURE — 3078F DIAST BP <80 MM HG: CPT | Performed by: FAMILY MEDICINE

## 2025-02-28 PROCEDURE — 1160F RVW MEDS BY RX/DR IN RCRD: CPT | Performed by: FAMILY MEDICINE

## 2025-02-28 PROCEDURE — G0447 BEHAVIOR COUNSEL OBESITY 15M: HCPCS | Performed by: FAMILY MEDICINE

## 2025-02-28 ASSESSMENT — ENCOUNTER SYMPTOMS
OCCASIONAL FEELINGS OF UNSTEADINESS: 0
DEPRESSION: 0
LOSS OF SENSATION IN FEET: 0

## 2025-02-28 ASSESSMENT — ACTIVITIES OF DAILY LIVING (ADL)
BATHING: INDEPENDENT
TAKING_MEDICATION: INDEPENDENT
MANAGING_FINANCES: INDEPENDENT
GROCERY_SHOPPING: INDEPENDENT
DOING_HOUSEWORK: INDEPENDENT
DRESSING: INDEPENDENT

## 2025-03-01 LAB
25(OH)D3+25(OH)D2 SERPL-MCNC: 38 NG/ML (ref 30–100)
ALBUMIN SERPL-MCNC: 4 G/DL (ref 3.6–5.1)
ALP SERPL-CCNC: 65 U/L (ref 37–153)
ALT SERPL-CCNC: 19 U/L (ref 6–29)
ANION GAP SERPL CALCULATED.4IONS-SCNC: 9 MMOL/L (CALC) (ref 7–17)
AST SERPL-CCNC: 27 U/L (ref 10–35)
BILIRUB SERPL-MCNC: 0.8 MG/DL (ref 0.2–1.2)
BUN SERPL-MCNC: 12 MG/DL (ref 7–25)
CALCIUM SERPL-MCNC: 9 MG/DL (ref 8.6–10.4)
CHLORIDE SERPL-SCNC: 104 MMOL/L (ref 98–110)
CHOLEST SERPL-MCNC: 211 MG/DL
CHOLEST/HDLC SERPL: 2.5 (CALC)
CO2 SERPL-SCNC: 28 MMOL/L (ref 20–32)
CREAT SERPL-MCNC: 0.83 MG/DL (ref 0.6–1)
EGFRCR SERPLBLD CKD-EPI 2021: 74 ML/MIN/1.73M2
ERYTHROCYTE [DISTWIDTH] IN BLOOD BY AUTOMATED COUNT: 13 % (ref 11–15)
GLUCOSE SERPL-MCNC: 74 MG/DL (ref 65–99)
HCT VFR BLD AUTO: 41.5 % (ref 35–45)
HDLC SERPL-MCNC: 84 MG/DL
HGB BLD-MCNC: 13.8 G/DL (ref 11.7–15.5)
LDLC SERPL CALC-MCNC: 108 MG/DL (CALC)
MCH RBC QN AUTO: 30.9 PG (ref 27–33)
MCHC RBC AUTO-ENTMCNC: 33.3 G/DL (ref 32–36)
MCV RBC AUTO: 92.8 FL (ref 80–100)
NONHDLC SERPL-MCNC: 127 MG/DL (CALC)
PLATELET # BLD AUTO: 240 THOUSAND/UL (ref 140–400)
PMV BLD REES-ECKER: 11.3 FL (ref 7.5–12.5)
POTASSIUM SERPL-SCNC: 3.8 MMOL/L (ref 3.5–5.3)
PROT SERPL-MCNC: 6.4 G/DL (ref 6.1–8.1)
RBC # BLD AUTO: 4.47 MILLION/UL (ref 3.8–5.1)
SODIUM SERPL-SCNC: 141 MMOL/L (ref 135–146)
TRIGL SERPL-MCNC: 92 MG/DL
TSH SERPL-ACNC: 1.83 MIU/L (ref 0.4–4.5)
WBC # BLD AUTO: 5.2 THOUSAND/UL (ref 3.8–10.8)

## 2025-03-09 PROBLEM — J44.1 ACUTE EXACERBATION OF CHRONIC OBSTRUCTIVE PULMONARY DISEASE (MULTI): Status: RESOLVED | Noted: 2024-01-12 | Resolved: 2025-03-09

## 2025-03-09 ASSESSMENT — ENCOUNTER SYMPTOMS
ARTHRALGIAS: 1
ENDOCRINE NEGATIVE: 1
CARDIOVASCULAR NEGATIVE: 1
RESPIRATORY NEGATIVE: 1
NAUSEA: 0
CHILLS: 0
FEVER: 0
NERVOUS/ANXIOUS: 1
MYALGIAS: 1
GASTROINTESTINAL NEGATIVE: 1
HEMATOLOGIC/LYMPHATIC NEGATIVE: 1
BACK PAIN: 1
ALLERGIC/IMMUNOLOGIC NEGATIVE: 1
VOMITING: 0

## 2025-03-09 NOTE — ASSESSMENT & PLAN NOTE
Check lipid panel continue medications continue healthy eating work out  150 minutes a week      Orders:    Lipid Panel; Future    CBC; Future    TSH with reflex to Free T4 if abnormal; Future    Comprehensive Metabolic Panel; Future    Vitamin D 25-Hydroxy,Total (for eval of Vitamin D levels); Future

## 2025-03-09 NOTE — ASSESSMENT & PLAN NOTE
Discussed modifiable ( diet , exercise, weight ) and non modifiable ( Age, family history) risk factors HTN .  Encouraged to stay physically active , healthy eating habits - limit red meat intake, more of plant based diet, freshly prepared foods etc.,    Adverse effects of untreated hypertension discussed which include CAD/MI, CVA ,  CKD etc.,     Orders:    Lipid Panel; Future    CBC; Future    TSH with reflex to Free T4 if abnormal; Future    Comprehensive Metabolic Panel; Future    Vitamin D 25-Hydroxy,Total (for eval of Vitamin D levels); Future

## 2025-03-09 NOTE — PROGRESS NOTES
Subjective   Reason for Visit: Ana Lira is an 75 y.o. female here for a Medicare Wellness visit.     Past Medical, Surgical, and Family History reviewed and updated in chart.    Reviewed all medications by prescribing practitioner or clinical pharmacist (such as prescriptions, OTCs, herbal therapies and supplements) and documented in the medical record.    HPI  Patient is here follow-up hypertension hyperlipidemia acid reflux back pain meningioma has started physical therapy feels much better has not had any abdominal pain had ultrasound of the liver which was fairly normal has history of endometrial carcinoma sees GYN blood pressure is well-controlled  Patient Care Team:  Taurus Espinosa MD as PCP - General     Review of Systems   Constitutional:  Negative for chills and fever.   HENT: Negative.     Respiratory: Negative.     Cardiovascular: Negative.    Gastrointestinal: Negative.  Negative for nausea and vomiting.   Endocrine: Negative.    Genitourinary: Negative.    Musculoskeletal:  Positive for arthralgias, back pain and myalgias. Negative for gait problem.   Skin: Negative.  Negative for rash.   Allergic/Immunologic: Negative.    Hematological: Negative.    Psychiatric/Behavioral:  The patient is nervous/anxious.    All other systems reviewed and are negative.      Objective   Vitals:  /73   Pulse 99   Ht 1.524 m (5')   Wt 91.2 kg (201 lb)   BMI 39.26 kg/m²       Physical Exam  Constitutional:       Appearance: Normal appearance.   HENT:      Right Ear: Tympanic membrane normal.      Left Ear: Tympanic membrane normal.      Nose: Nose normal.      Mouth/Throat:      Mouth: Mucous membranes are moist.   Cardiovascular:      Rate and Rhythm: Normal rate and regular rhythm.   Pulmonary:      Effort: Pulmonary effort is normal.      Breath sounds: Normal breath sounds.   Abdominal:      General: Bowel sounds are normal.   Musculoskeletal:         General: Tenderness present.   Neurological:       General: No focal deficit present.      Mental Status: She is alert.   Psychiatric:         Mood and Affect: Mood normal.         Assessment & Plan  Primary hypertension  Discussed modifiable ( diet , exercise, weight ) and non modifiable ( Age, family history) risk factors HTN .  Encouraged to stay physically active , healthy eating habits - limit red meat intake, more of plant based diet, freshly prepared foods etc.,    Adverse effects of untreated hypertension discussed which include CAD/MI, CVA ,  CKD etc.,     Orders:    Lipid Panel; Future    CBC; Future    TSH with reflex to Free T4 if abnormal; Future    Comprehensive Metabolic Panel; Future    Vitamin D 25-Hydroxy,Total (for eval of Vitamin D levels); Future    Dyslipidemia  Check lipid panel continue medications continue healthy eating work out  150 minutes a week      Orders:    Lipid Panel; Future    CBC; Future    TSH with reflex to Free T4 if abnormal; Future    Comprehensive Metabolic Panel; Future    Vitamin D 25-Hydroxy,Total (for eval of Vitamin D levels); Future    Stage 3a chronic kidney disease (Multi)    Orders:    Lipid Panel; Future    CBC; Future    TSH with reflex to Free T4 if abnormal; Future    Comprehensive Metabolic Panel; Future    Vitamin D 25-Hydroxy,Total (for eval of Vitamin D levels); Future    Hypokalemia    Orders:    Lipid Panel; Future    CBC; Future    TSH with reflex to Free T4 if abnormal; Future    Comprehensive Metabolic Panel; Future    Vitamin D 25-Hydroxy,Total (for eval of Vitamin D levels); Future    Elevated LFTs    Orders:    Lipid Panel; Future    CBC; Future    TSH with reflex to Free T4 if abnormal; Future    Comprehensive Metabolic Panel; Future    Vitamin D 25-Hydroxy,Total (for eval of Vitamin D levels); Future    Vitamin D deficiency    Orders:    Lipid Panel; Future    CBC; Future    TSH with reflex to Free T4 if abnormal; Future    Comprehensive Metabolic Panel; Future    Vitamin D 25-Hydroxy,Total (for  eval of Vitamin D levels); Future    Routine general medical examination at health care facility         Class 2 severe obesity with serious comorbidity and body mass index (BMI) of 39.0 to 39.9 in adult, unspecified obesity type  patient is advised to lose weight by reducing calorie intake and increasing activity levels, especially aerobic exercise          Meningioma (Multi)  UTD MRI         Spinal stenosis of lumbar region with neurogenic claudication  Patient will continue to work out and start physical therapy                Obesity Counseling  15 - 20 minutes were spent counseling on diet and exercise interventions to address obesity and weight reduction.

## 2025-03-09 NOTE — ASSESSMENT & PLAN NOTE
Orders:    Lipid Panel; Future    CBC; Future    TSH with reflex to Free T4 if abnormal; Future    Comprehensive Metabolic Panel; Future    Vitamin D 25-Hydroxy,Total (for eval of Vitamin D levels); Future

## 2025-03-10 DIAGNOSIS — I10 PRIMARY HYPERTENSION: ICD-10-CM

## 2025-03-10 RX ORDER — LISINOPRIL AND HYDROCHLOROTHIAZIDE 12.5; 2 MG/1; MG/1
1 TABLET ORAL DAILY
Qty: 90 TABLET | Refills: 1 | Status: SHIPPED | OUTPATIENT
Start: 2025-03-10 | End: 2026-03-10

## 2025-05-28 ENCOUNTER — APPOINTMENT (OUTPATIENT)
Dept: PRIMARY CARE | Facility: CLINIC | Age: 75
End: 2025-05-28
Payer: MEDICARE

## 2025-05-28 VITALS
HEIGHT: 60 IN | HEART RATE: 72 BPM | WEIGHT: 207 LBS | SYSTOLIC BLOOD PRESSURE: 125 MMHG | BODY MASS INDEX: 40.64 KG/M2 | DIASTOLIC BLOOD PRESSURE: 82 MMHG

## 2025-05-28 DIAGNOSIS — K04.7 DENTAL INFECTION: ICD-10-CM

## 2025-05-28 DIAGNOSIS — M54.50 ACUTE RIGHT-SIDED LOW BACK PAIN WITHOUT SCIATICA: ICD-10-CM

## 2025-05-28 DIAGNOSIS — M47.816 LUMBAR SPONDYLOSIS: ICD-10-CM

## 2025-05-28 DIAGNOSIS — Z12.31 VISIT FOR SCREENING MAMMOGRAM: ICD-10-CM

## 2025-05-28 DIAGNOSIS — E55.9 VITAMIN D DEFICIENCY: ICD-10-CM

## 2025-05-28 DIAGNOSIS — E78.5 DYSLIPIDEMIA: ICD-10-CM

## 2025-05-28 DIAGNOSIS — N18.31 STAGE 3A CHRONIC KIDNEY DISEASE (MULTI): ICD-10-CM

## 2025-05-28 DIAGNOSIS — I10 ESSENTIAL HYPERTENSION: Primary | ICD-10-CM

## 2025-05-28 DIAGNOSIS — K29.40 ATROPHIC GASTRITIS, PRESENCE OF BLEEDING UNSPECIFIED: ICD-10-CM

## 2025-05-28 PROCEDURE — 99214 OFFICE O/P EST MOD 30 MIN: CPT | Performed by: FAMILY MEDICINE

## 2025-05-28 PROCEDURE — 3008F BODY MASS INDEX DOCD: CPT | Performed by: FAMILY MEDICINE

## 2025-05-28 PROCEDURE — 3074F SYST BP LT 130 MM HG: CPT | Performed by: FAMILY MEDICINE

## 2025-05-28 PROCEDURE — 3079F DIAST BP 80-89 MM HG: CPT | Performed by: FAMILY MEDICINE

## 2025-05-28 PROCEDURE — G2211 COMPLEX E/M VISIT ADD ON: HCPCS | Performed by: FAMILY MEDICINE

## 2025-05-28 PROCEDURE — 1159F MED LIST DOCD IN RCRD: CPT | Performed by: FAMILY MEDICINE

## 2025-05-28 RX ORDER — AMOXICILLIN 500 MG/1
500 CAPSULE ORAL EVERY 8 HOURS SCHEDULED
Qty: 21 CAPSULE | Refills: 0 | Status: SHIPPED | OUTPATIENT
Start: 2025-05-28 | End: 2025-06-04

## 2025-05-28 RX ORDER — PREDNISONE 20 MG/1
40 TABLET ORAL DAILY
Qty: 14 TABLET | Refills: 0 | Status: SHIPPED | OUTPATIENT
Start: 2025-05-28 | End: 2025-06-04

## 2025-05-29 LAB
ALBUMIN SERPL-MCNC: 4.1 G/DL (ref 3.6–5.1)
ALP SERPL-CCNC: 67 U/L (ref 37–153)
ALT SERPL-CCNC: 22 U/L (ref 6–29)
ANION GAP SERPL CALCULATED.4IONS-SCNC: 12 MMOL/L (CALC) (ref 7–17)
AST SERPL-CCNC: 27 U/L (ref 10–35)
BILIRUB SERPL-MCNC: 0.8 MG/DL (ref 0.2–1.2)
BUN SERPL-MCNC: 15 MG/DL (ref 7–25)
CALCIUM SERPL-MCNC: 9.1 MG/DL (ref 8.6–10.4)
CHLORIDE SERPL-SCNC: 106 MMOL/L (ref 98–110)
CO2 SERPL-SCNC: 23 MMOL/L (ref 20–32)
CREAT SERPL-MCNC: 0.9 MG/DL (ref 0.6–1)
EGFRCR SERPLBLD CKD-EPI 2021: 67 ML/MIN/1.73M2
ERYTHROCYTE [DISTWIDTH] IN BLOOD BY AUTOMATED COUNT: 12.5 % (ref 11–15)
GLUCOSE SERPL-MCNC: 188 MG/DL (ref 65–99)
HCT VFR BLD AUTO: 44.1 % (ref 35–45)
HGB BLD-MCNC: 14.7 G/DL (ref 11.7–15.5)
MCH RBC QN AUTO: 31.1 PG (ref 27–33)
MCHC RBC AUTO-ENTMCNC: 33.3 G/DL (ref 32–36)
MCV RBC AUTO: 93.2 FL (ref 80–100)
PLATELET # BLD AUTO: 219 THOUSAND/UL (ref 140–400)
PMV BLD REES-ECKER: 11.1 FL (ref 7.5–12.5)
POTASSIUM SERPL-SCNC: 3.9 MMOL/L (ref 3.5–5.3)
PROT SERPL-MCNC: 6.7 G/DL (ref 6.1–8.1)
RBC # BLD AUTO: 4.73 MILLION/UL (ref 3.8–5.1)
SODIUM SERPL-SCNC: 141 MMOL/L (ref 135–146)
WBC # BLD AUTO: 4.7 THOUSAND/UL (ref 3.8–10.8)

## 2025-05-30 ENCOUNTER — CLINICAL SUPPORT (OUTPATIENT)
Dept: PRIMARY CARE | Facility: CLINIC | Age: 75
End: 2025-05-30
Payer: MEDICARE

## 2025-05-30 VITALS — BODY MASS INDEX: 40.64 KG/M2 | HEIGHT: 60 IN | WEIGHT: 207 LBS

## 2025-05-30 DIAGNOSIS — E11.9 DIABETES MELLITUS WITH HEMOGLOBIN A1C GOAL OF 7.0%-8.0% (MULTI): ICD-10-CM

## 2025-05-30 LAB — POC HEMOGLOBIN A1C: 5.4 % (ref 4.2–6.5)

## 2025-05-30 PROCEDURE — 83036 HEMOGLOBIN GLYCOSYLATED A1C: CPT | Performed by: FAMILY MEDICINE

## 2025-05-30 ASSESSMENT — ENCOUNTER SYMPTOMS
LOSS OF SENSATION IN FEET: 0
DEPRESSION: 0
OCCASIONAL FEELINGS OF UNSTEADINESS: 0

## 2025-06-01 PROBLEM — I10 BP (HIGH BLOOD PRESSURE): Status: RESOLVED | Noted: 2023-02-05 | Resolved: 2025-06-01

## 2025-06-01 ASSESSMENT — ENCOUNTER SYMPTOMS
VOMITING: 0
GASTROINTESTINAL NEGATIVE: 1
RESPIRATORY NEGATIVE: 1
HEMATOLOGIC/LYMPHATIC NEGATIVE: 1
CHILLS: 0
BACK PAIN: 1
ALLERGIC/IMMUNOLOGIC NEGATIVE: 1
FEVER: 0
NERVOUS/ANXIOUS: 1
ARTHRALGIAS: 1
ENDOCRINE NEGATIVE: 1
MYALGIAS: 1
NAUSEA: 0
CARDIOVASCULAR NEGATIVE: 1

## 2025-06-01 NOTE — PROGRESS NOTES
Subjective   Reason for Visit: Ana Lira is an 75 y.o. female here for follow up HTN HLD back pain.     Past Medical, Surgical, and Family History reviewed and updated in chart.         HPI  Patient is here follow-up hypertension hyperlipidemia acid reflux back pain meningioma has started physical therapy feels much better has not had any abdominal pain had ultrasound of the liver which was fairly normal has history of endometrial carcinoma sees GYN blood pressure is well-controlled  Patient Care Team:  Taurus Espinosa MD as PCP - General     Review of Systems   Constitutional:  Negative for chills and fever.   HENT: Negative.     Respiratory: Negative.     Cardiovascular: Negative.    Gastrointestinal: Negative.  Negative for nausea and vomiting.   Endocrine: Negative.    Genitourinary: Negative.    Musculoskeletal:  Positive for arthralgias, back pain and myalgias. Negative for gait problem.   Skin: Negative.  Negative for rash.   Allergic/Immunologic: Negative.    Hematological: Negative.    Psychiatric/Behavioral:  The patient is nervous/anxious.    All other systems reviewed and are negative.      Objective   Vitals:  /82   Pulse 72   Ht 1.524 m (5')   Wt 93.9 kg (207 lb)   BMI 40.43 kg/m²       Physical Exam  Constitutional:       Appearance: Normal appearance.   HENT:      Right Ear: Tympanic membrane normal.      Left Ear: Tympanic membrane normal.      Nose: Nose normal.      Mouth/Throat:      Mouth: Mucous membranes are moist.   Cardiovascular:      Rate and Rhythm: Normal rate and regular rhythm.   Pulmonary:      Effort: Pulmonary effort is normal.      Breath sounds: Normal breath sounds.   Abdominal:      General: Bowel sounds are normal.   Musculoskeletal:         General: Tenderness present.   Neurological:      General: No focal deficit present.      Mental Status: She is alert.   Psychiatric:         Mood and Affect: Mood normal.         Assessment & Plan  Lumbar  spondylosis    Orders:    predniSONE (Deltasone) 20 mg tablet; Take 2 tablets (40 mg) by mouth once daily for 7 days.    Dental infection    Orders:    amoxicillin (Amoxil) 500 mg capsule; Take 1 capsule (500 mg) by mouth every 8 hours for 7 days.    CBC; Future    Comprehensive Metabolic Panel; Future    Visit for screening mammogram    Orders:    BI mammo bilateral screening tomosynthesis; Future    Stage 3a chronic kidney disease (Multi)    Orders:    Albumin-Creatinine Ratio, Urine Random; Future    Essential hypertension  Will recheck blood pressure is 118/78 patient will monitor closely at home call back if she has low numbers maintain water intake         Vitamin D deficiency         Dyslipidemia  Check lipid panel continue medications continue healthy eating work out  150 minutes a week             Atrophic gastritis, presence of bleeding unspecified  C/w Carafate which has provided relief. Has GI follow up .          Acute right-sided low back pain without sciatica  C/w Home exercise program                Obesity Counseling  15 - 20 minutes were spent counseling on diet and exercise interventions to address obesity and weight reduction.

## 2025-06-01 NOTE — ASSESSMENT & PLAN NOTE
Orders:    predniSONE (Deltasone) 20 mg tablet; Take 2 tablets (40 mg) by mouth once daily for 7 days.

## 2025-06-01 NOTE — ASSESSMENT & PLAN NOTE
Check lipid panel continue medications continue healthy eating work out  150 minutes a week

## 2025-06-03 ENCOUNTER — TELEPHONE (OUTPATIENT)
Dept: PRIMARY CARE | Facility: CLINIC | Age: 75
End: 2025-06-03
Payer: MEDICARE

## 2025-06-06 ENCOUNTER — TELEPHONE (OUTPATIENT)
Dept: PRIMARY CARE | Facility: CLINIC | Age: 75
End: 2025-06-06
Payer: MEDICARE

## 2025-06-06 DIAGNOSIS — J01.00 ACUTE NON-RECURRENT MAXILLARY SINUSITIS: Primary | ICD-10-CM

## 2025-06-06 RX ORDER — AMOXICILLIN 500 MG/1
500 TABLET, FILM COATED ORAL EVERY 8 HOURS SCHEDULED
Qty: 21 TABLET | Refills: 0 | Status: SHIPPED | OUTPATIENT
Start: 2025-06-06 | End: 2025-06-13

## 2025-08-05 ENCOUNTER — HOSPITAL ENCOUNTER (OUTPATIENT)
Dept: RADIOLOGY | Facility: CLINIC | Age: 75
Discharge: HOME | End: 2025-08-05
Payer: MEDICARE

## 2025-08-05 DIAGNOSIS — Z12.31 VISIT FOR SCREENING MAMMOGRAM: ICD-10-CM

## 2025-08-05 PROCEDURE — 77063 BREAST TOMOSYNTHESIS BI: CPT | Performed by: RADIOLOGY

## 2025-08-05 PROCEDURE — 77067 SCR MAMMO BI INCL CAD: CPT | Performed by: RADIOLOGY

## 2025-08-05 PROCEDURE — 77067 SCR MAMMO BI INCL CAD: CPT

## 2025-08-11 ENCOUNTER — TELEPHONE (OUTPATIENT)
Dept: PRIMARY CARE | Facility: CLINIC | Age: 75
End: 2025-08-11
Payer: MEDICARE

## 2025-08-28 ENCOUNTER — TELEPHONE (OUTPATIENT)
Dept: PRIMARY CARE | Facility: CLINIC | Age: 75
End: 2025-08-28

## 2025-08-28 ENCOUNTER — APPOINTMENT (OUTPATIENT)
Dept: PRIMARY CARE | Facility: CLINIC | Age: 75
End: 2025-08-28
Payer: MEDICARE

## 2025-08-31 PROBLEM — M25.551 RIGHT HIP PAIN: Status: ACTIVE | Noted: 2025-08-31

## 2025-08-31 PROBLEM — Z78.0 MENOPAUSE: Status: ACTIVE | Noted: 2025-08-31

## 2025-08-31 ASSESSMENT — ENCOUNTER SYMPTOMS
FEVER: 0
VOMITING: 0
ARTHRALGIAS: 1
MYALGIAS: 1
BACK PAIN: 1
GASTROINTESTINAL NEGATIVE: 1
ENDOCRINE NEGATIVE: 1
NAUSEA: 0
CHILLS: 0
HEMATOLOGIC/LYMPHATIC NEGATIVE: 1
CARDIOVASCULAR NEGATIVE: 1
NERVOUS/ANXIOUS: 1
RESPIRATORY NEGATIVE: 1
ALLERGIC/IMMUNOLOGIC NEGATIVE: 1

## 2025-09-02 ENCOUNTER — HOSPITAL ENCOUNTER (OUTPATIENT)
Dept: RADIOLOGY | Facility: CLINIC | Age: 75
Discharge: HOME | End: 2025-09-02
Payer: MEDICARE

## 2025-09-02 DIAGNOSIS — M25.551 RIGHT HIP PAIN: ICD-10-CM

## 2025-09-02 PROCEDURE — 73502 X-RAY EXAM HIP UNI 2-3 VIEWS: CPT | Mod: RT

## 2025-09-02 PROCEDURE — 73502 X-RAY EXAM HIP UNI 2-3 VIEWS: CPT | Mod: RIGHT SIDE | Performed by: INTERNAL MEDICINE

## 2025-12-03 ENCOUNTER — APPOINTMENT (OUTPATIENT)
Dept: PRIMARY CARE | Facility: CLINIC | Age: 75
End: 2025-12-03
Payer: MEDICARE